# Patient Record
Sex: MALE | Race: WHITE | Employment: FULL TIME | URBAN - METROPOLITAN AREA
[De-identification: names, ages, dates, MRNs, and addresses within clinical notes are randomized per-mention and may not be internally consistent; named-entity substitution may affect disease eponyms.]

---

## 2017-08-03 PROCEDURE — 81015 MICROSCOPIC EXAM OF URINE: CPT | Performed by: PHYSICIAN ASSISTANT

## 2017-08-03 PROCEDURE — 81001 URINALYSIS AUTO W/SCOPE: CPT | Performed by: PHYSICIAN ASSISTANT

## 2017-08-04 PROCEDURE — 84154 ASSAY OF PSA FREE: CPT | Performed by: UROLOGY

## 2017-08-04 PROCEDURE — 84153 ASSAY OF PSA TOTAL: CPT | Performed by: UROLOGY

## 2017-11-27 PROCEDURE — 80061 LIPID PANEL: CPT | Performed by: INTERNAL MEDICINE

## 2018-03-09 PROCEDURE — 84153 ASSAY OF PSA TOTAL: CPT | Performed by: UROLOGY

## 2018-03-09 PROCEDURE — 84154 ASSAY OF PSA FREE: CPT | Performed by: UROLOGY

## 2018-03-09 PROCEDURE — 80053 COMPREHEN METABOLIC PANEL: CPT | Performed by: UROLOGY

## 2018-03-09 PROCEDURE — 80061 LIPID PANEL: CPT | Performed by: UROLOGY

## 2018-03-09 PROCEDURE — 36415 COLL VENOUS BLD VENIPUNCTURE: CPT | Performed by: UROLOGY

## 2018-03-12 PROCEDURE — 82043 UR ALBUMIN QUANTITATIVE: CPT | Performed by: INTERNAL MEDICINE

## 2018-03-12 PROCEDURE — 82570 ASSAY OF URINE CREATININE: CPT | Performed by: INTERNAL MEDICINE

## 2018-03-12 PROCEDURE — 36415 COLL VENOUS BLD VENIPUNCTURE: CPT | Performed by: INTERNAL MEDICINE

## 2019-04-17 PROCEDURE — 86803 HEPATITIS C AB TEST: CPT | Performed by: INTERNAL MEDICINE

## 2019-10-31 PROBLEM — E66.812 OBESITY, CLASS II, BMI 35-39.9: Status: ACTIVE | Noted: 2019-10-31

## 2019-10-31 PROBLEM — E66.9 OBESITY, CLASS II, BMI 35-39.9: Status: ACTIVE | Noted: 2019-10-31

## 2020-01-20 ENCOUNTER — APPOINTMENT (OUTPATIENT)
Dept: LAB | Facility: HOSPITAL | Age: 65
End: 2020-01-20
Payer: COMMERCIAL

## 2020-01-20 ENCOUNTER — HOSPITAL ENCOUNTER (OUTPATIENT)
Dept: PHYSICAL THERAPY | Facility: HOSPITAL | Age: 65
Discharge: HOME | End: 2020-01-20
Attending: ORTHOPAEDIC SURGERY
Payer: COMMERCIAL

## 2020-01-20 ENCOUNTER — LABORATORY ENCOUNTER (OUTPATIENT)
Dept: LAB | Facility: HOSPITAL | Age: 65
End: 2020-01-20
Payer: COMMERCIAL

## 2020-01-20 DIAGNOSIS — M16.11 PRIMARY OSTEOARTHRITIS OF RIGHT HIP: ICD-10-CM

## 2020-01-20 LAB
ANION GAP SERPL CALC-SCNC: 5 MMOL/L (ref 0–18)
ANTIBODY SCREEN: NEGATIVE
BASOPHILS # BLD AUTO: 0.07 X10(3) UL (ref 0–0.2)
BASOPHILS NFR BLD AUTO: 0.9 %
BUN BLD-MCNC: 16 MG/DL (ref 7–18)
BUN/CREAT SERPL: 17.8 (ref 10–20)
CALCIUM BLD-MCNC: 9.3 MG/DL (ref 8.5–10.1)
CHLORIDE SERPL-SCNC: 106 MMOL/L (ref 98–112)
CO2 SERPL-SCNC: 27 MMOL/L (ref 21–32)
CREAT BLD-MCNC: 0.9 MG/DL (ref 0.7–1.3)
DEPRECATED RDW RBC AUTO: 43.9 FL (ref 35.1–46.3)
EOSINOPHIL # BLD AUTO: 0.36 X10(3) UL (ref 0–0.7)
EOSINOPHIL NFR BLD AUTO: 4.9 %
ERYTHROCYTE [DISTWIDTH] IN BLOOD BY AUTOMATED COUNT: 12.7 % (ref 11–15)
GLUCOSE BLD-MCNC: 151 MG/DL (ref 70–99)
HCT VFR BLD AUTO: 43.2 % (ref 39–53)
HGB BLD-MCNC: 13.9 G/DL (ref 13–17.5)
IMM GRANULOCYTES # BLD AUTO: 0.02 X10(3) UL (ref 0–1)
IMM GRANULOCYTES NFR BLD: 0.3 %
LYMPHOCYTES # BLD AUTO: 2.02 X10(3) UL (ref 1–4)
LYMPHOCYTES NFR BLD AUTO: 27.3 %
MCH RBC QN AUTO: 30.5 PG (ref 26–34)
MCHC RBC AUTO-ENTMCNC: 32.2 G/DL (ref 31–37)
MCV RBC AUTO: 94.7 FL (ref 80–100)
MONOCYTES # BLD AUTO: 0.74 X10(3) UL (ref 0.1–1)
MONOCYTES NFR BLD AUTO: 10 %
NEUTROPHILS # BLD AUTO: 4.18 X10 (3) UL (ref 1.5–7.7)
NEUTROPHILS # BLD AUTO: 4.18 X10(3) UL (ref 1.5–7.7)
NEUTROPHILS NFR BLD AUTO: 56.6 %
OSMOLALITY SERPL CALC.SUM OF ELEC: 290 MOSM/KG (ref 275–295)
PLATELET # BLD AUTO: 279 10(3)UL (ref 150–450)
POTASSIUM SERPL-SCNC: 4.3 MMOL/L (ref 3.5–5.1)
RBC # BLD AUTO: 4.56 X10(6)UL (ref 4.3–5.7)
RH BLOOD TYPE: POSITIVE
SODIUM SERPL-SCNC: 138 MMOL/L (ref 136–145)
WBC # BLD AUTO: 7.4 X10(3) UL (ref 4–11)

## 2020-01-20 PROCEDURE — 86900 BLOOD TYPING SEROLOGIC ABO: CPT

## 2020-01-20 PROCEDURE — 80048 BASIC METABOLIC PNL TOTAL CA: CPT

## 2020-01-20 PROCEDURE — 85025 COMPLETE CBC W/AUTO DIFF WBC: CPT

## 2020-01-20 PROCEDURE — 36415 COLL VENOUS BLD VENIPUNCTURE: CPT

## 2020-01-20 PROCEDURE — 86850 RBC ANTIBODY SCREEN: CPT

## 2020-01-20 PROCEDURE — 86901 BLOOD TYPING SEROLOGIC RH(D): CPT

## 2020-01-29 NOTE — H&P (VIEW-ONLY)
Patient ID: Oriana Oh     Chief Complaint:    Patient presents with:  Pre-Op: pre op for 2/6/2020 right total hip       HPI:    Oriana Oh is a 59year old male who presents today for Patient presents with:  Pre-Op: pre op for 2/6/2020 right total without mention of complication, not stated as uncontrolled    • Unspecified essential hypertension    • Unspecified sleep apnea     not using CPAP   • Visual impairment     glasses     Past Surgical History:   Procedure Laterality Date   • COLONOSCOPY  20 Besylate 10 MG Oral Tab Take 1 tablet (10 mg total) by mouth once daily. 90 tablet 3   • ACCU-CHEK FASTCLIX LANCETS Does not apply Misc Pt has accu-chek joan meter. Tests twice daily 200 each 3   • Multiple Vitamins-Minerals (MULTI-VITAMIN/MINERALS) Oral Ta foot  Negative Trendelenburg sign  Negative Stinchfield sign  2+ pedal pulses and brisk capillary refill  No skin rashes or lesion noted  Knee: no deformity noted, PF crepitus mild,no joint line tenderness, no effusion, ROM 0-120    Left hip: Limited inter length discrepancy, failure of the implant, need for future surgeries, continued pain, hematoma, need for transfusion, and death, among others. The patient understands and wishes to proceed.      The spectrum of treatment options were discussed with the pa expected, requiring blood transfusion. You may get an infection which will require additional surgery and possibly removal of all implants to cure.  Patients with diabetes or who are on certain medications to suppress the immune system are particularly at lumbar surgery  Allergy ace inhibitor    - labs and clearance reviewed  - questions answered  - proceed with surgery as planned R HUMBERTO    Diagnoses and all orders for this visit:    Primary osteoarthritis of right hip  -     OFFICE/OUTPT VISIT,REX WHALEN II

## 2020-02-06 ENCOUNTER — ANESTHESIA (OUTPATIENT)
Dept: SURGERY | Facility: HOSPITAL | Age: 65
End: 2020-02-06
Payer: COMMERCIAL

## 2020-02-06 ENCOUNTER — HOSPITAL ENCOUNTER (OUTPATIENT)
Facility: HOSPITAL | Age: 65
Setting detail: HOSPITAL OUTPATIENT SURGERY
Discharge: HOME | End: 2020-02-06
Attending: ORTHOPAEDIC SURGERY | Admitting: ORTHOPAEDIC SURGERY
Payer: COMMERCIAL

## 2020-02-06 ENCOUNTER — ANESTHESIA EVENT (OUTPATIENT)
Dept: SURGERY | Facility: HOSPITAL | Age: 65
End: 2020-02-06
Payer: COMMERCIAL

## 2020-02-06 ENCOUNTER — APPOINTMENT (OUTPATIENT)
Dept: GENERAL RADIOLOGY | Facility: HOSPITAL | Age: 65
End: 2020-02-06
Attending: ORTHOPAEDIC SURGERY
Payer: COMMERCIAL

## 2020-02-06 VITALS
BODY MASS INDEX: 35.03 KG/M2 | RESPIRATION RATE: 18 BRPM | WEIGHT: 244.69 LBS | DIASTOLIC BLOOD PRESSURE: 80 MMHG | SYSTOLIC BLOOD PRESSURE: 143 MMHG | OXYGEN SATURATION: 93 % | HEIGHT: 70 IN | HEART RATE: 69 BPM | TEMPERATURE: 98 F

## 2020-02-06 DIAGNOSIS — M16.11 PRIMARY OSTEOARTHRITIS OF RIGHT HIP: Primary | ICD-10-CM

## 2020-02-06 LAB
GLUCOSE BLD-MCNC: 133 MG/DL (ref 70–99)
GLUCOSE BLD-MCNC: 177 MG/DL (ref 70–99)

## 2020-02-06 PROCEDURE — 82962 GLUCOSE BLOOD TEST: CPT

## 2020-02-06 PROCEDURE — 73501 X-RAY EXAM HIP UNI 1 VIEW: CPT | Performed by: ORTHOPAEDIC SURGERY

## 2020-02-06 PROCEDURE — 76000 FLUOROSCOPY <1 HR PHYS/QHP: CPT | Performed by: ORTHOPAEDIC SURGERY

## 2020-02-06 PROCEDURE — 88305 TISSUE EXAM BY PATHOLOGIST: CPT | Performed by: ORTHOPAEDIC SURGERY

## 2020-02-06 PROCEDURE — 97116 GAIT TRAINING THERAPY: CPT

## 2020-02-06 PROCEDURE — 97161 PT EVAL LOW COMPLEX 20 MIN: CPT

## 2020-02-06 PROCEDURE — 88311 DECALCIFY TISSUE: CPT | Performed by: ORTHOPAEDIC SURGERY

## 2020-02-06 PROCEDURE — 0SR90JA REPLACEMENT OF RIGHT HIP JOINT WITH SYNTHETIC SUBSTITUTE, UNCEMENTED, OPEN APPROACH: ICD-10-PCS | Performed by: ORTHOPAEDIC SURGERY

## 2020-02-06 PROCEDURE — 97535 SELF CARE MNGMENT TRAINING: CPT

## 2020-02-06 PROCEDURE — 97165 OT EVAL LOW COMPLEX 30 MIN: CPT

## 2020-02-06 PROCEDURE — 97530 THERAPEUTIC ACTIVITIES: CPT

## 2020-02-06 DEVICE — BIOLOX® DELTA, CERAMIC FEMORAL HEAD, M, Ø 36/0, TAPER 12/14
Type: IMPLANTABLE DEVICE | Site: HIP | Status: FUNCTIONAL
Brand: BIOLOX® DELTA

## 2020-02-06 RX ORDER — SODIUM CHLORIDE, SODIUM LACTATE, POTASSIUM CHLORIDE, CALCIUM CHLORIDE 600; 310; 30; 20 MG/100ML; MG/100ML; MG/100ML; MG/100ML
INJECTION, SOLUTION INTRAVENOUS CONTINUOUS
Status: DISCONTINUED | OUTPATIENT
Start: 2020-02-06 | End: 2020-02-06

## 2020-02-06 RX ORDER — METOCLOPRAMIDE HYDROCHLORIDE 5 MG/ML
10 INJECTION INTRAMUSCULAR; INTRAVENOUS EVERY 6 HOURS PRN
Status: DISCONTINUED | OUTPATIENT
Start: 2020-02-06 | End: 2020-02-06

## 2020-02-06 RX ORDER — ACETAMINOPHEN 325 MG/1
650 TABLET ORAL EVERY 6 HOURS PRN
Status: DISCONTINUED | OUTPATIENT
Start: 2020-02-06 | End: 2020-02-06

## 2020-02-06 RX ORDER — ASPIRIN 325 MG
325 TABLET, DELAYED RELEASE (ENTERIC COATED) ORAL DAILY
Status: DISCONTINUED | OUTPATIENT
Start: 2020-02-06 | End: 2020-02-06

## 2020-02-06 RX ORDER — OXYCODONE HYDROCHLORIDE 5 MG/1
15 TABLET ORAL EVERY 4 HOURS PRN
Status: CANCELLED | OUTPATIENT
Start: 2020-02-06 | End: 2020-02-08

## 2020-02-06 RX ORDER — ONDANSETRON 2 MG/ML
4 INJECTION INTRAMUSCULAR; INTRAVENOUS EVERY 4 HOURS PRN
Status: DISCONTINUED | OUTPATIENT
Start: 2020-02-06 | End: 2020-02-06

## 2020-02-06 RX ORDER — HYDROCODONE BITARTRATE AND ACETAMINOPHEN 10; 325 MG/1; MG/1
2 TABLET ORAL AS NEEDED
Status: COMPLETED | OUTPATIENT
Start: 2020-02-06 | End: 2020-02-06

## 2020-02-06 RX ORDER — NALOXONE HYDROCHLORIDE 0.4 MG/ML
80 INJECTION, SOLUTION INTRAMUSCULAR; INTRAVENOUS; SUBCUTANEOUS AS NEEDED
Status: DISCONTINUED | OUTPATIENT
Start: 2020-02-06 | End: 2020-02-06

## 2020-02-06 RX ORDER — DEXTROSE MONOHYDRATE 25 G/50ML
50 INJECTION, SOLUTION INTRAVENOUS
Status: DISCONTINUED | OUTPATIENT
Start: 2020-02-06 | End: 2020-02-06

## 2020-02-06 RX ORDER — ONDANSETRON 2 MG/ML
4 INJECTION INTRAMUSCULAR; INTRAVENOUS AS NEEDED
Status: DISCONTINUED | OUTPATIENT
Start: 2020-02-06 | End: 2020-02-06

## 2020-02-06 RX ORDER — DIPHENHYDRAMINE HYDROCHLORIDE 50 MG/ML
25 INJECTION INTRAMUSCULAR; INTRAVENOUS ONCE AS NEEDED
Status: DISCONTINUED | OUTPATIENT
Start: 2020-02-06 | End: 2020-02-06

## 2020-02-06 RX ORDER — CEFAZOLIN SODIUM/WATER 2 G/20 ML
2 SYRINGE (ML) INTRAVENOUS EVERY 8 HOURS
Status: CANCELLED | OUTPATIENT
Start: 2020-02-06 | End: 2020-02-06

## 2020-02-06 RX ORDER — OXYCODONE HYDROCHLORIDE 5 MG/1
TABLET ORAL
Qty: 30 TABLET | Refills: 0 | Status: SHIPPED | OUTPATIENT
Start: 2020-02-06 | End: 2020-02-12

## 2020-02-06 RX ORDER — SCOLOPAMINE TRANSDERMAL SYSTEM 1 MG/1
1 PATCH, EXTENDED RELEASE TRANSDERMAL ONCE
Status: DISCONTINUED | OUTPATIENT
Start: 2020-02-06 | End: 2020-02-06 | Stop reason: HOSPADM

## 2020-02-06 RX ORDER — HYDROMORPHONE HYDROCHLORIDE 1 MG/ML
0.8 INJECTION, SOLUTION INTRAMUSCULAR; INTRAVENOUS; SUBCUTANEOUS EVERY 2 HOUR PRN
Status: CANCELLED | OUTPATIENT
Start: 2020-02-06 | End: 2020-02-08

## 2020-02-06 RX ORDER — CEFAZOLIN SODIUM/WATER 2 G/20 ML
2 SYRINGE (ML) INTRAVENOUS ONCE
Status: COMPLETED | OUTPATIENT
Start: 2020-02-06 | End: 2020-02-06

## 2020-02-06 RX ORDER — ACETAMINOPHEN 325 MG/1
TABLET ORAL
Status: COMPLETED
Start: 2020-02-06 | End: 2020-02-06

## 2020-02-06 RX ORDER — HYDROMORPHONE HYDROCHLORIDE 1 MG/ML
INJECTION, SOLUTION INTRAMUSCULAR; INTRAVENOUS; SUBCUTANEOUS
Status: COMPLETED
Start: 2020-02-06 | End: 2020-02-06

## 2020-02-06 RX ORDER — CELECOXIB 200 MG/1
200 CAPSULE ORAL DAILY
Qty: 30 CAPSULE | Refills: 0 | Status: SHIPPED | OUTPATIENT
Start: 2020-02-06 | End: 2020-06-17

## 2020-02-06 RX ORDER — ACETAMINOPHEN 325 MG/1
650 TABLET ORAL EVERY 4 HOURS
Qty: 30 TABLET | Refills: 0 | Status: SHIPPED | OUTPATIENT
Start: 2020-02-06 | End: 2020-06-17

## 2020-02-06 RX ORDER — OXYCODONE HYDROCHLORIDE 5 MG/1
10 TABLET ORAL EVERY 4 HOURS PRN
Status: CANCELLED | OUTPATIENT
Start: 2020-02-06 | End: 2020-02-08

## 2020-02-06 RX ORDER — OXYCODONE HYDROCHLORIDE 5 MG/1
5 TABLET ORAL EVERY 4 HOURS PRN
Status: CANCELLED | OUTPATIENT
Start: 2020-02-06 | End: 2020-02-08

## 2020-02-06 RX ORDER — MIDAZOLAM HYDROCHLORIDE 1 MG/ML
INJECTION INTRAMUSCULAR; INTRAVENOUS AS NEEDED
Status: DISCONTINUED | OUTPATIENT
Start: 2020-02-06 | End: 2020-02-06 | Stop reason: SURG

## 2020-02-06 RX ORDER — TRANEXAMIC ACID 10 MG/ML
INJECTION, SOLUTION INTRAVENOUS AS NEEDED
Status: DISCONTINUED | OUTPATIENT
Start: 2020-02-06 | End: 2020-02-06 | Stop reason: SURG

## 2020-02-06 RX ORDER — TRAMADOL HYDROCHLORIDE 50 MG/1
TABLET ORAL
Qty: 40 TABLET | Refills: 0 | Status: SHIPPED | OUTPATIENT
Start: 2020-02-06 | End: 2020-02-12

## 2020-02-06 RX ORDER — DEXTROSE MONOHYDRATE 25 G/50ML
50 INJECTION, SOLUTION INTRAVENOUS
Status: DISCONTINUED | OUTPATIENT
Start: 2020-02-06 | End: 2020-02-06 | Stop reason: HOSPADM

## 2020-02-06 RX ORDER — HYDROMORPHONE HYDROCHLORIDE 1 MG/ML
0.4 INJECTION, SOLUTION INTRAMUSCULAR; INTRAVENOUS; SUBCUTANEOUS EVERY 2 HOUR PRN
Status: CANCELLED | OUTPATIENT
Start: 2020-02-06 | End: 2020-02-08

## 2020-02-06 RX ORDER — TRANEXAMIC ACID 10 MG/ML
1000 INJECTION, SOLUTION INTRAVENOUS
Status: DISPENSED | OUTPATIENT
Start: 2020-02-06 | End: 2020-02-06

## 2020-02-06 RX ORDER — ACETAMINOPHEN 325 MG/1
650 TABLET ORAL 4 TIMES DAILY
Status: CANCELLED | OUTPATIENT
Start: 2020-02-06 | End: 2020-02-08

## 2020-02-06 RX ORDER — HYDROCODONE BITARTRATE AND ACETAMINOPHEN 10; 325 MG/1; MG/1
1 TABLET ORAL AS NEEDED
Status: COMPLETED | OUTPATIENT
Start: 2020-02-06 | End: 2020-02-06

## 2020-02-06 RX ORDER — TIZANIDINE 2 MG/1
2 TABLET ORAL 3 TIMES DAILY PRN
Status: CANCELLED | OUTPATIENT
Start: 2020-02-06

## 2020-02-06 RX ORDER — PROCHLORPERAZINE EDISYLATE 5 MG/ML
10 INJECTION INTRAMUSCULAR; INTRAVENOUS EVERY 6 HOURS PRN
Status: CANCELLED | OUTPATIENT
Start: 2020-02-06 | End: 2020-02-08

## 2020-02-06 RX ORDER — METOCLOPRAMIDE HYDROCHLORIDE 5 MG/ML
10 INJECTION INTRAMUSCULAR; INTRAVENOUS AS NEEDED
Status: DISCONTINUED | OUTPATIENT
Start: 2020-02-06 | End: 2020-02-06

## 2020-02-06 RX ORDER — ASPIRIN 325 MG
325 TABLET ORAL 2 TIMES DAILY
Status: DISCONTINUED | OUTPATIENT
Start: 2020-02-06 | End: 2020-02-06

## 2020-02-06 RX ORDER — ACETAMINOPHEN 500 MG
1000 TABLET ORAL ONCE
Status: DISCONTINUED | OUTPATIENT
Start: 2020-02-06 | End: 2020-02-06

## 2020-02-06 RX ORDER — LOVASTATIN 20 MG/1
20 TABLET ORAL NIGHTLY
COMMUNITY
End: 2020-03-27

## 2020-02-06 RX ORDER — HYDROMORPHONE HYDROCHLORIDE 1 MG/ML
0.2 INJECTION, SOLUTION INTRAMUSCULAR; INTRAVENOUS; SUBCUTANEOUS EVERY 2 HOUR PRN
Status: CANCELLED | OUTPATIENT
Start: 2020-02-06 | End: 2020-02-08

## 2020-02-06 RX ORDER — HYDROMORPHONE HYDROCHLORIDE 1 MG/ML
0.4 INJECTION, SOLUTION INTRAMUSCULAR; INTRAVENOUS; SUBCUTANEOUS EVERY 5 MIN PRN
Status: DISCONTINUED | OUTPATIENT
Start: 2020-02-06 | End: 2020-02-06

## 2020-02-06 RX ORDER — ASPIRIN 325 MG
325 TABLET, DELAYED RELEASE (ENTERIC COATED) ORAL 2 TIMES DAILY
Qty: 84 TABLET | Refills: 0 | Status: SHIPPED | OUTPATIENT
Start: 2020-02-06 | End: 2020-06-17

## 2020-02-06 RX ADMIN — CEFAZOLIN SODIUM/WATER 2 G: 2 G/20 ML SYRINGE (ML) INTRAVENOUS at 07:19:00

## 2020-02-06 RX ADMIN — MIDAZOLAM HYDROCHLORIDE 4 MG: 1 INJECTION INTRAMUSCULAR; INTRAVENOUS at 07:04:00

## 2020-02-06 RX ADMIN — TRANEXAMIC ACID 1000 MG: 10 INJECTION, SOLUTION INTRAVENOUS at 07:30:00

## 2020-02-06 RX ADMIN — SODIUM CHLORIDE, SODIUM LACTATE, POTASSIUM CHLORIDE, CALCIUM CHLORIDE: 600; 310; 30; 20 INJECTION, SOLUTION INTRAVENOUS at 07:59:00

## 2020-02-06 RX ADMIN — SODIUM CHLORIDE, SODIUM LACTATE, POTASSIUM CHLORIDE, CALCIUM CHLORIDE: 600; 310; 30; 20 INJECTION, SOLUTION INTRAVENOUS at 09:10:00

## 2020-02-06 NOTE — OCCUPATIONAL THERAPY NOTE
OCCUPATIONAL THERAPY QUICK EVALUATION - INPATIENT    Room Number: [facility] PRE ASCC/ PRE ASCC Pool  Evaluation Date: 2/6/2020     Type of Evaluation: Quick Eval  Presenting Problem: s/p R HUMBERTO on 2/6     Physician Order: IP Consult to Occupational Therapy  Reas )    Occupation/Status: FT - has to walk long distances occasionally   Hand Dominance: Right  Drives: Yes  Patient Regularly Uses: Glasses    Prior Level of Function: Pt lives with his wife.  Pt is typically independent with all ADL and functional in bed in a semi-supine position. Pt performed supine>sit EOB with CG assistance. Pt performed sit>stand with RW and CG assistance with min verbal/visual/tactile cues for safety with RW use and hand placement.  Pt performed functional mobility with CG jo ann Patient discharged from Occupational Therapy services. Please re-order if a new functional limitation presents during this admission. Patient was able to achieve the following goals:  Patient able to toilet transfer: at supervision level.    Patient abl

## 2020-02-06 NOTE — INTERVAL H&P NOTE
Pre-op Diagnosis: Primary osteoarthritis of right hip [M16.11]    The above referenced H&P was reviewed by Forrest Berkowitz MD on 2/6/2020, the patient was examined and no significant changes have occurred in the patient's condition since the H&P was perform

## 2020-02-06 NOTE — CM/SW NOTE
02/06/20 1500   CM/SW Referral Data   Referral Source Nurse   Reason for Referral Discharge planning   Informant Patient;Spouse   Patient Info   Patient's Mental Status Alert;Oriented   Discharge Needs   Anticipated D/C needs Home health care       HOME

## 2020-02-06 NOTE — OPERATIVE REPORT
OPERATIVE REPORT    Facility:  [facility]    Patient Name:  Catia Raphael    Age/Gender:  72year old male  :  1955    MRN:  [id number]    Date of Operation:  2020    Preoperative Diagnosis:  RIGHT HIP OSTEOARTHRITIS    Postoperative Viri Osteotomy of the femoral neck was performed using a sagittal saw. A corkscrew was used to remove the femoral head. Retractors were placed anterior and posterior to the acetabulum.   The hip labrum as well as the soft tissue in the cotyloid fossa were kurt anesthetic and pain medicine was injected into the hip capsule and surrounding soft tissues. The fascia meet was closed with absorbable suture. Skin was then closed in 2 layers with absorbable suture.   Sterile dressings were applied in the forma of a pre

## 2020-02-06 NOTE — ANESTHESIA PROCEDURE NOTES
Regional Block  Performed by: Keysha Prabhakar MD  Authorized by: Keysha Prabhakar MD       General Information and Staff    Start Time:  2/6/2020 7:21 AM  End Time:  2/6/2020 7:22 AM  Anesthesiologist:  Keysha Prabhakar MD  Performed by:   Anesthesio

## 2020-02-06 NOTE — ANESTHESIA PROCEDURE NOTES
Spinal Block  Performed by: Hussain Bhatt MD  Authorized by: Hussain Bhatt MD       General Information and Staff    Start Time:  2/6/2020 7:04 AM  End Time:  2/6/2020 7:10 AM  Anesthesiologist:  Hussain Bhatt MD  Performed by:   Anesthesiolo

## 2020-02-06 NOTE — PHYSICAL THERAPY NOTE
PHYSICAL THERAPY HIP EVALUATION - INPATIENT     Room Number: [facility] PRE ASCC/ PRE ASCC Pool  Evaluation Date: 2/6/2020  Type of Evaluation: Initial  Physician Order: PT Eval and Treat    Presenting Problem: s/p left HUMBERTO 2/6/2020  Reason for Therapy: Mobility assist as needed upon edw dc.         SUBJECTIVE  \"I am hoping to go home today\"    Patient self-stated goal is to go home today    OBJECTIVE  Precautions: None(no hip precautions per conversation with Dr Davi Damon)  Fall Risk: Standard fall risk    WEIGHT Gait Assistance: Supervision  Distance (ft): 150  Assistive Device: Rolling walker  Pattern: R Decreased stance time  Stoop/Curb Assistance: Supervision  Comment : 4 stairs with railing and cane with supervision    Skilled Therapy Provided: Pt seen by [facility] home PT.      DISCHARGE RECOMMENDATIONS  PT Discharge Recommendations: Home with home health PT                    CURRENT GOALS  Goal #1  Patient is able to demonstrate supine - sit EOB @ level: supervision     Goal #2  Patient is able to demonstrate bethea

## 2020-02-06 NOTE — ANESTHESIA PREPROCEDURE EVALUATION
PRE-OP EVALUATION    Patient Name: Collins Phalen    Pre-op Diagnosis: Primary osteoarthritis of right hip [M16.11]    Procedure(s):  RIGHT ANTERIOR HIP ARTHROPLASTY     Surgeon(s) and Role:     Tee Ray MD - Primary    Pre-op vitals reviewed.   Tem mouth once daily. , Disp: 90 tablet, Rfl: 3  tamsulosin HCl (FLOMAX) 0.4 MG Oral Cap, Take 1 capsule (0.4 mg total) by mouth every evening., Disp: 90 capsule, Rfl: 3  metFORMIN HCl 500 MG Oral Tab, TAKE 2 TABLETS BY MOUTH IN THE MORNING AND 2 TABLETS IN THE Smokeless tobacco: Former User        Types: Snuff, Chew      Tobacco comment: chews tobacco    Alcohol use: Yes      Frequency: 2-3 times a week      Drinks per session: 1 or 2      Binge frequency: Never      Drug use: No     Available pre-op labs revie

## 2020-02-06 NOTE — ANESTHESIA POSTPROCEDURE EVALUATION
[address] Patient Status:  Surgery Admit - Inpt   Age/Gender 72year old male MRN [id number]   [facility] SURGERY Attending Jayden Rogers MD   [facility] Day # 0 PCP Khari Barriga MD       Anesthesia Post-op Note    Procedu

## 2020-02-27 ENCOUNTER — APPOINTMENT (OUTPATIENT)
Dept: REHABILITATION | Age: 65
End: 2020-02-27

## 2020-03-02 DIAGNOSIS — Z96.641 HISTORY OF RIGHT HIP REPLACEMENT: Primary | ICD-10-CM

## 2020-03-03 ENCOUNTER — APPOINTMENT (OUTPATIENT)
Dept: REHABILITATION | Age: 65
End: 2020-03-03

## 2020-03-03 ENCOUNTER — HOSPITAL ENCOUNTER (OUTPATIENT)
Dept: REHABILITATION | Age: 65
Discharge: STILL ADMITTED OR ADMINISTRATIVE | End: 2020-03-03
Attending: ORTHOPAEDIC SURGERY

## 2020-03-03 DIAGNOSIS — Z96.641 HISTORY OF RIGHT HIP REPLACEMENT: ICD-10-CM

## 2020-03-03 PROCEDURE — 97161 PT EVAL LOW COMPLEX 20 MIN: CPT | Performed by: PHYSICAL THERAPIST

## 2020-03-03 PROCEDURE — 97110 THERAPEUTIC EXERCISES: CPT | Performed by: PHYSICAL THERAPIST

## 2020-03-03 ASSESSMENT — ENCOUNTER SYMPTOMS
QUALITY: STIFF
SUBJECTIVE PAIN PROGRESSION: IMPROVED
QUALITY: TIGHT
PAIN SEVERITY NOW: 0
ALLEVIATING FACTORS: ICE

## 2020-03-03 ASSESSMENT — MOVEMENT AND STRENGTH ASSESSMENTS: LEFS TYPE SCORE: 49

## 2020-03-05 ENCOUNTER — APPOINTMENT (OUTPATIENT)
Dept: REHABILITATION | Age: 65
End: 2020-03-05

## 2020-03-05 ENCOUNTER — HOSPITAL ENCOUNTER (OUTPATIENT)
Dept: REHABILITATION | Age: 65
Discharge: STILL ADMITTED OR ADMINISTRATIVE | End: 2020-03-05
Attending: ORTHOPAEDIC SURGERY

## 2020-03-05 PROCEDURE — 97110 THERAPEUTIC EXERCISES: CPT

## 2020-03-05 PROCEDURE — 97140 MANUAL THERAPY 1/> REGIONS: CPT

## 2020-03-05 PROCEDURE — 97116 GAIT TRAINING THERAPY: CPT

## 2020-03-10 ENCOUNTER — APPOINTMENT (OUTPATIENT)
Dept: REHABILITATION | Age: 65
End: 2020-03-10

## 2020-03-17 ENCOUNTER — HOSPITAL ENCOUNTER (OUTPATIENT)
Dept: REHABILITATION | Age: 65
Discharge: STILL ADMITTED OR ADMINISTRATIVE | End: 2020-03-17

## 2020-03-17 DIAGNOSIS — Z96.641 HISTORY OF RIGHT HIP REPLACEMENT: ICD-10-CM

## 2020-03-17 PROCEDURE — 97110 THERAPEUTIC EXERCISES: CPT | Performed by: PHYSICAL THERAPIST

## 2020-03-17 PROCEDURE — 97140 MANUAL THERAPY 1/> REGIONS: CPT | Performed by: PHYSICAL THERAPIST

## 2020-03-17 ASSESSMENT — ENCOUNTER SYMPTOMS: PAIN SEVERITY NOW: 0

## 2020-03-19 ENCOUNTER — HOSPITAL ENCOUNTER (OUTPATIENT)
Dept: REHABILITATION | Age: 65
Discharge: STILL ADMITTED OR ADMINISTRATIVE | End: 2020-03-19

## 2020-03-19 DIAGNOSIS — Z96.641 HISTORY OF RIGHT HIP REPLACEMENT: ICD-10-CM

## 2020-03-19 PROCEDURE — 97140 MANUAL THERAPY 1/> REGIONS: CPT | Performed by: PHYSICAL THERAPIST

## 2020-03-19 PROCEDURE — 97110 THERAPEUTIC EXERCISES: CPT | Performed by: PHYSICAL THERAPIST

## 2020-03-19 ASSESSMENT — ENCOUNTER SYMPTOMS: PAIN SEVERITY NOW: 0

## 2020-03-24 ENCOUNTER — HOSPITAL ENCOUNTER (OUTPATIENT)
Dept: REHABILITATION | Age: 65
Discharge: STILL ADMITTED OR ADMINISTRATIVE | End: 2020-03-24

## 2020-03-24 DIAGNOSIS — Z96.641 HISTORY OF RIGHT HIP REPLACEMENT: ICD-10-CM

## 2020-03-24 PROCEDURE — 97110 THERAPEUTIC EXERCISES: CPT | Performed by: PHYSICAL THERAPIST

## 2020-03-24 PROCEDURE — 97140 MANUAL THERAPY 1/> REGIONS: CPT | Performed by: PHYSICAL THERAPIST

## 2020-03-24 ASSESSMENT — ENCOUNTER SYMPTOMS: PAIN SEVERITY NOW: 0

## 2020-03-26 ENCOUNTER — HOSPITAL ENCOUNTER (OUTPATIENT)
Dept: REHABILITATION | Age: 65
Discharge: STILL ADMITTED OR ADMINISTRATIVE | End: 2020-03-26

## 2020-03-26 DIAGNOSIS — Z96.641 HISTORY OF RIGHT HIP REPLACEMENT: ICD-10-CM

## 2020-03-26 PROCEDURE — 97110 THERAPEUTIC EXERCISES: CPT | Performed by: PHYSICAL THERAPIST

## 2020-03-26 PROCEDURE — 97140 MANUAL THERAPY 1/> REGIONS: CPT | Performed by: PHYSICAL THERAPIST

## 2020-03-26 ASSESSMENT — ENCOUNTER SYMPTOMS: PAIN SEVERITY NOW: 0

## 2020-03-31 ENCOUNTER — HOSPITAL ENCOUNTER (OUTPATIENT)
Dept: REHABILITATION | Age: 65
Discharge: STILL ADMITTED OR ADMINISTRATIVE | End: 2020-03-31

## 2020-03-31 PROCEDURE — 97140 MANUAL THERAPY 1/> REGIONS: CPT | Performed by: PHYSICAL THERAPIST

## 2020-03-31 PROCEDURE — 97110 THERAPEUTIC EXERCISES: CPT | Performed by: PHYSICAL THERAPIST

## 2020-03-31 ASSESSMENT — ENCOUNTER SYMPTOMS: PAIN SEVERITY NOW: 0

## 2020-04-02 ENCOUNTER — HOSPITAL ENCOUNTER (OUTPATIENT)
Dept: REHABILITATION | Age: 65
Discharge: STILL ADMITTED OR ADMINISTRATIVE | End: 2020-04-02
Attending: ORTHOPAEDIC SURGERY

## 2020-04-02 PROCEDURE — 97110 THERAPEUTIC EXERCISES: CPT | Performed by: PHYSICAL THERAPIST

## 2020-04-02 PROCEDURE — 97140 MANUAL THERAPY 1/> REGIONS: CPT | Performed by: PHYSICAL THERAPIST

## 2020-04-02 ASSESSMENT — ENCOUNTER SYMPTOMS: PAIN SEVERITY NOW: 0

## 2020-04-07 ENCOUNTER — APPOINTMENT (OUTPATIENT)
Dept: REHABILITATION | Age: 65
End: 2020-04-07

## 2020-04-09 ENCOUNTER — HOSPITAL ENCOUNTER (OUTPATIENT)
Dept: REHABILITATION | Age: 65
Discharge: STILL ADMITTED OR ADMINISTRATIVE | End: 2020-04-09

## 2020-04-09 PROCEDURE — 97110 THERAPEUTIC EXERCISES: CPT | Performed by: PHYSICAL THERAPIST

## 2020-04-09 PROCEDURE — 97140 MANUAL THERAPY 1/> REGIONS: CPT | Performed by: PHYSICAL THERAPIST

## 2020-06-11 PROBLEM — E11.9 CONTROLLED TYPE 2 DIABETES MELLITUS WITHOUT COMPLICATION, WITHOUT LONG-TERM CURRENT USE OF INSULIN (HCC): Status: ACTIVE | Noted: 2020-06-11

## 2021-02-20 ENCOUNTER — IMMUNIZATION (OUTPATIENT)
Dept: LAB | Age: 66
End: 2021-02-20

## 2021-02-20 DIAGNOSIS — Z23 NEED FOR VACCINATION: Primary | ICD-10-CM

## 2021-02-20 PROCEDURE — 0011A COVID-19 MODERNA VACCINE: CPT

## 2021-02-20 PROCEDURE — 91301 COVID-19 MODERNA VACCINE: CPT

## 2021-03-20 ENCOUNTER — IMMUNIZATION (OUTPATIENT)
Dept: LAB | Age: 66
End: 2021-03-20
Attending: HOSPITALIST

## 2021-03-20 DIAGNOSIS — Z23 NEED FOR VACCINATION: Primary | ICD-10-CM

## 2021-03-20 PROCEDURE — 0012A COVID-19 MODERNA VACCINE: CPT

## 2021-03-20 PROCEDURE — 91301 COVID-19 MODERNA VACCINE: CPT

## 2021-03-30 PROBLEM — I71.2 ASCENDING AORTIC ANEURYSM (HCC): Status: ACTIVE | Noted: 2021-03-30

## 2021-03-30 PROBLEM — I71.21 ASCENDING AORTIC ANEURYSM (HCC): Status: ACTIVE | Noted: 2021-03-30

## 2021-04-22 PROBLEM — I25.10 CORONARY ARTERY DISEASE INVOLVING NATIVE CORONARY ARTERY OF NATIVE HEART: Status: ACTIVE | Noted: 2021-04-22

## 2021-10-15 PROBLEM — E11.9 CONTROLLED TYPE 2 DIABETES MELLITUS WITHOUT COMPLICATION, WITHOUT LONG-TERM CURRENT USE OF INSULIN (HCC): Status: RESOLVED | Noted: 2020-06-11 | Resolved: 2021-10-15

## 2021-10-15 PROBLEM — E11.29 TYPE 2 DIABETES MELLITUS WITH MICROALBUMINURIA, WITHOUT LONG-TERM CURRENT USE OF INSULIN (HCC): Status: ACTIVE | Noted: 2021-10-15

## 2021-10-15 PROBLEM — I25.118 CORONARY ARTERY DISEASE OF NATIVE ARTERY OF NATIVE HEART WITH STABLE ANGINA PECTORIS (HCC): Status: ACTIVE | Noted: 2021-10-15

## 2021-10-15 PROBLEM — I25.10 CORONARY ARTERY DISEASE INVOLVING NATIVE CORONARY ARTERY OF NATIVE HEART: Status: RESOLVED | Noted: 2021-04-22 | Resolved: 2021-10-15

## 2021-10-15 PROBLEM — R80.9 TYPE 2 DIABETES MELLITUS WITH MICROALBUMINURIA, WITHOUT LONG-TERM CURRENT USE OF INSULIN (HCC): Status: ACTIVE | Noted: 2021-10-15

## 2022-03-01 PROBLEM — E66.01 SEVERE OBESITY (BMI 35.0-39.9) WITH COMORBIDITY (HCC): Status: ACTIVE | Noted: 2022-03-01

## 2022-12-14 ENCOUNTER — APPOINTMENT (OUTPATIENT)
Dept: GENERAL RADIOLOGY | Age: 67
End: 2022-12-14
Attending: PHYSICIAN ASSISTANT

## 2022-12-14 ENCOUNTER — HOSPITAL ENCOUNTER (EMERGENCY)
Age: 67
Discharge: HOME | End: 2022-12-14
Attending: STUDENT IN AN ORGANIZED HEALTH CARE EDUCATION/TRAINING PROGRAM

## 2022-12-14 VITALS
RESPIRATION RATE: 20 BRPM | SYSTOLIC BLOOD PRESSURE: 164 MMHG | HEART RATE: 78 BPM | DIASTOLIC BLOOD PRESSURE: 87 MMHG | OXYGEN SATURATION: 94 % | TEMPERATURE: 98.3 F

## 2022-12-14 DIAGNOSIS — J02.9 PHARYNGITIS WITH VIRAL SYNDROME: Primary | ICD-10-CM

## 2022-12-14 DIAGNOSIS — B34.9 PHARYNGITIS WITH VIRAL SYNDROME: Primary | ICD-10-CM

## 2022-12-14 LAB
ATRIAL RATE (BPM): 89
FLUAV RNA RESP QL NAA+PROBE: NOT DETECTED
FLUBV RNA RESP QL NAA+PROBE: NOT DETECTED
INTERNAL PROCEDURAL CONTROLS ACCEPTABLE: YES
P AXIS (DEGREES): 46
PR-INTERVAL (MSEC): 198
QRS-INTERVAL (MSEC): 84
QT-INTERVAL (MSEC): 366
QTC: 445
R AXIS (DEGREES): -76
REPORT TEXT: NORMAL
RSV AG NPH QL IA.RAPID: NOT DETECTED
S PYO AG THROAT QL IA.RAPID: NEGATIVE
SARS-COV-2 RNA RESP QL NAA+PROBE: NOT DETECTED
SERVICE CMNT-IMP: NORMAL
SERVICE CMNT-IMP: NORMAL
T AXIS (DEGREES): 34
VENTRICULAR RATE EKG/MIN (BPM): 89

## 2022-12-14 PROCEDURE — 0241U COVID/FLU/RSV PANEL: CPT | Performed by: PHYSICIAN ASSISTANT

## 2022-12-14 PROCEDURE — 10002801 HB RX 250 W/O HCPCS: Performed by: PHYSICIAN ASSISTANT

## 2022-12-14 PROCEDURE — 71046 X-RAY EXAM CHEST 2 VIEWS: CPT

## 2022-12-14 PROCEDURE — C9803 HOPD COVID-19 SPEC COLLECT: HCPCS

## 2022-12-14 PROCEDURE — 99285 EMERGENCY DEPT VISIT HI MDM: CPT

## 2022-12-14 PROCEDURE — 93010 ELECTROCARDIOGRAM REPORT: CPT | Performed by: INTERNAL MEDICINE

## 2022-12-14 PROCEDURE — 93005 ELECTROCARDIOGRAM TRACING: CPT | Performed by: PHYSICIAN ASSISTANT

## 2022-12-14 PROCEDURE — 87880 STREP A ASSAY W/OPTIC: CPT | Performed by: PHYSICIAN ASSISTANT

## 2022-12-14 PROCEDURE — 94640 AIRWAY INHALATION TREATMENT: CPT

## 2022-12-14 RX ORDER — AMOXICILLIN AND CLAVULANATE POTASSIUM 875; 125 MG/1; MG/1
1 TABLET, FILM COATED ORAL 2 TIMES DAILY
Qty: 20 TABLET | Refills: 0 | Status: SHIPPED | OUTPATIENT
Start: 2022-12-14 | End: 2022-12-24

## 2022-12-14 RX ORDER — IPRATROPIUM BROMIDE AND ALBUTEROL SULFATE 2.5; .5 MG/3ML; MG/3ML
3 SOLUTION RESPIRATORY (INHALATION) ONCE
Status: COMPLETED | OUTPATIENT
Start: 2022-12-14 | End: 2022-12-14

## 2022-12-14 RX ADMIN — Medication 15 ML: at 12:50

## 2022-12-14 RX ADMIN — IPRATROPIUM BROMIDE AND ALBUTEROL SULFATE 3 ML: .5; 3 SOLUTION RESPIRATORY (INHALATION) at 10:37

## 2022-12-14 ASSESSMENT — PAIN SCALES - GENERAL: PAINLEVEL_OUTOF10: 6

## 2023-02-21 ENCOUNTER — WALK IN (OUTPATIENT)
Dept: URGENT CARE | Age: 68
End: 2023-02-21

## 2023-02-21 VITALS
DIASTOLIC BLOOD PRESSURE: 76 MMHG | SYSTOLIC BLOOD PRESSURE: 163 MMHG | HEIGHT: 70 IN | WEIGHT: 234 LBS | RESPIRATION RATE: 18 BRPM | HEART RATE: 76 BPM | TEMPERATURE: 97.7 F | BODY MASS INDEX: 33.5 KG/M2

## 2023-02-21 DIAGNOSIS — H66.92 LEFT OTITIS MEDIA, UNSPECIFIED OTITIS MEDIA TYPE: Primary | ICD-10-CM

## 2023-02-21 PROCEDURE — 99213 OFFICE O/P EST LOW 20 MIN: CPT | Performed by: NURSE PRACTITIONER

## 2023-02-21 RX ORDER — AMOXICILLIN AND CLAVULANATE POTASSIUM 875; 125 MG/1; MG/1
1 TABLET, FILM COATED ORAL EVERY 12 HOURS
Qty: 20 TABLET | Refills: 0 | Status: SHIPPED | OUTPATIENT
Start: 2023-02-21 | End: 2023-03-03

## 2023-02-21 RX ORDER — GUAIFENESIN AND DEXTROMETHORPHAN HYDROBROMIDE 1200; 60 MG/1; MG/1
1 TABLET, EXTENDED RELEASE ORAL 2 TIMES DAILY PRN
Qty: 28 TABLET | COMMUNITY
Start: 2023-02-21

## 2023-02-21 ASSESSMENT — ENCOUNTER SYMPTOMS
SINUS PRESSURE: 0
CHILLS: 0
EYES NEGATIVE: 1
GASTROINTESTINAL NEGATIVE: 1
NEUROLOGICAL NEGATIVE: 1
HEMATOLOGIC/LYMPHATIC NEGATIVE: 1
PSYCHIATRIC NEGATIVE: 1
SINUS PAIN: 0
COUGH: 1
SORE THROAT: 0
HEADACHES: 0
FEVER: 0
RHINORRHEA: 0
ENDOCRINE NEGATIVE: 1

## 2023-02-27 ENCOUNTER — WALK IN (OUTPATIENT)
Dept: URGENT CARE | Age: 68
End: 2023-02-27

## 2023-02-27 VITALS
WEIGHT: 234 LBS | DIASTOLIC BLOOD PRESSURE: 70 MMHG | HEIGHT: 70 IN | SYSTOLIC BLOOD PRESSURE: 140 MMHG | HEART RATE: 70 BPM | TEMPERATURE: 97.7 F | RESPIRATION RATE: 18 BRPM | BODY MASS INDEX: 33.5 KG/M2

## 2023-02-27 DIAGNOSIS — H65.91 FLUID LEVEL BEHIND TYMPANIC MEMBRANE OF RIGHT EAR: ICD-10-CM

## 2023-02-27 DIAGNOSIS — H60.312 ACUTE DIFFUSE OTITIS EXTERNA OF LEFT EAR: Primary | ICD-10-CM

## 2023-02-27 PROBLEM — I71.21 ASCENDING AORTIC ANEURYSM (CMD): Status: ACTIVE | Noted: 2021-03-30

## 2023-02-27 PROBLEM — R80.9 TYPE 2 DIABETES MELLITUS WITH MICROALBUMINURIA, WITHOUT LONG-TERM CURRENT USE OF INSULIN (CMD): Status: ACTIVE | Noted: 2021-10-15

## 2023-02-27 PROBLEM — E11.29 TYPE 2 DIABETES MELLITUS WITH MICROALBUMINURIA, WITHOUT LONG-TERM CURRENT USE OF INSULIN (CMD): Status: ACTIVE | Noted: 2021-10-15

## 2023-02-27 PROBLEM — I25.118 CORONARY ARTERY DISEASE OF NATIVE ARTERY OF NATIVE HEART WITH STABLE ANGINA PECTORIS (CMD): Status: ACTIVE | Noted: 2021-10-15

## 2023-02-27 PROBLEM — E66.9 OBESITY, CLASS II, BMI 35-39.9: Status: ACTIVE | Noted: 2019-10-31

## 2023-02-27 PROCEDURE — 3077F SYST BP >= 140 MM HG: CPT | Performed by: NURSE PRACTITIONER

## 2023-02-27 PROCEDURE — 3078F DIAST BP <80 MM HG: CPT | Performed by: NURSE PRACTITIONER

## 2023-02-27 PROCEDURE — 99213 OFFICE O/P EST LOW 20 MIN: CPT | Performed by: NURSE PRACTITIONER

## 2023-02-27 RX ORDER — IBUPROFEN 600 MG/1
600 TABLET ORAL EVERY 6 HOURS PRN
Qty: 20 TABLET | Refills: 0 | Status: SHIPPED | OUTPATIENT
Start: 2023-02-27

## 2023-02-27 RX ORDER — CIPROFLOXACIN AND DEXAMETHASONE 3; 1 MG/ML; MG/ML
4 SUSPENSION/ DROPS AURICULAR (OTIC) 2 TIMES DAILY
Qty: 7.5 ML | Refills: 0 | Status: SHIPPED | OUTPATIENT
Start: 2023-02-27

## 2023-02-27 ASSESSMENT — ENCOUNTER SYMPTOMS
EYES NEGATIVE: 1
ALLERGIC/IMMUNOLOGIC NEGATIVE: 1
FATIGUE: 1
GASTROINTESTINAL NEGATIVE: 1
NEUROLOGICAL NEGATIVE: 1
RESPIRATORY NEGATIVE: 1
RHINORRHEA: 1
PSYCHIATRIC NEGATIVE: 1

## 2024-07-05 RX ORDER — HEPARIN SODIUM 5000 [USP'U]/ML
5000 INJECTION, SOLUTION INTRAVENOUS; SUBCUTANEOUS ONCE
Status: CANCELLED | OUTPATIENT
Start: 2024-07-05 | End: 2024-07-05

## 2024-07-12 ENCOUNTER — LABORATORY ENCOUNTER (OUTPATIENT)
Dept: LAB | Age: 69
End: 2024-07-12
Attending: UROLOGY
Payer: COMMERCIAL

## 2024-07-12 DIAGNOSIS — Z01.818 PRE-OP TESTING: ICD-10-CM

## 2024-07-12 LAB
ANION GAP SERPL CALC-SCNC: 6 MMOL/L (ref 0–18)
ANTIBODY SCREEN: NEGATIVE
BUN BLD-MCNC: 12 MG/DL (ref 9–23)
BUN/CREAT SERPL: 12.4 (ref 10–20)
CALCIUM BLD-MCNC: 9.8 MG/DL (ref 8.7–10.4)
CHLORIDE SERPL-SCNC: 106 MMOL/L (ref 98–112)
CO2 SERPL-SCNC: 28 MMOL/L (ref 21–32)
CREAT BLD-MCNC: 0.97 MG/DL
DEPRECATED RDW RBC AUTO: 43.8 FL (ref 35.1–46.3)
EGFRCR SERPLBLD CKD-EPI 2021: 85 ML/MIN/1.73M2 (ref 60–?)
ERYTHROCYTE [DISTWIDTH] IN BLOOD BY AUTOMATED COUNT: 13 % (ref 11–15)
FASTING STATUS PATIENT QL REPORTED: YES
GLUCOSE BLD-MCNC: 125 MG/DL (ref 70–99)
HCT VFR BLD AUTO: 41 %
HGB BLD-MCNC: 13.6 G/DL
MCH RBC QN AUTO: 30.4 PG (ref 26–34)
MCHC RBC AUTO-ENTMCNC: 33.2 G/DL (ref 31–37)
MCV RBC AUTO: 91.5 FL
OSMOLALITY SERPL CALC.SUM OF ELEC: 291 MOSM/KG (ref 275–295)
PLATELET # BLD AUTO: 296 10(3)UL (ref 150–450)
POTASSIUM SERPL-SCNC: 4.7 MMOL/L (ref 3.5–5.1)
RBC # BLD AUTO: 4.48 X10(6)UL
RH BLOOD TYPE: POSITIVE
SODIUM SERPL-SCNC: 140 MMOL/L (ref 136–145)
WBC # BLD AUTO: 7.3 X10(3) UL (ref 4–11)

## 2024-07-12 PROCEDURE — 86850 RBC ANTIBODY SCREEN: CPT

## 2024-07-12 PROCEDURE — 86901 BLOOD TYPING SEROLOGIC RH(D): CPT

## 2024-07-12 PROCEDURE — 85027 COMPLETE CBC AUTOMATED: CPT

## 2024-07-12 PROCEDURE — 86900 BLOOD TYPING SEROLOGIC ABO: CPT

## 2024-07-12 PROCEDURE — 36415 COLL VENOUS BLD VENIPUNCTURE: CPT

## 2024-07-12 PROCEDURE — 80048 BASIC METABOLIC PNL TOTAL CA: CPT

## 2024-07-18 ENCOUNTER — ANESTHESIA EVENT (OUTPATIENT)
Dept: SURGERY | Facility: HOSPITAL | Age: 69
End: 2024-07-18
Payer: COMMERCIAL

## 2024-07-24 ENCOUNTER — HOSPITAL ENCOUNTER (OUTPATIENT)
Facility: HOSPITAL | Age: 69
Setting detail: OBSERVATION
Discharge: HOME | DRG: 708 | End: 2024-07-26
Attending: UROLOGY | Admitting: UROLOGY
Payer: COMMERCIAL

## 2024-07-24 ENCOUNTER — HOSPITAL ENCOUNTER (INPATIENT)
Facility: HOSPITAL | Age: 69
LOS: 1 days | Discharge: HOME | End: 2024-07-26
Attending: UROLOGY | Admitting: UROLOGY
Payer: COMMERCIAL

## 2024-07-24 ENCOUNTER — ANESTHESIA (OUTPATIENT)
Dept: SURGERY | Facility: HOSPITAL | Age: 69
End: 2024-07-24
Payer: COMMERCIAL

## 2024-07-24 DIAGNOSIS — Z01.818 PRE-OP TESTING: Primary | ICD-10-CM

## 2024-07-24 PROBLEM — C61 PROSTATE CANCER (HCC): Status: ACTIVE | Noted: 2024-07-24

## 2024-07-24 LAB
ALBUMIN SERPL-MCNC: 4.2 G/DL (ref 3.2–4.8)
ALP LIVER SERPL-CCNC: 73 U/L
ALT SERPL-CCNC: 22 U/L
ANION GAP SERPL CALC-SCNC: 5 MMOL/L (ref 0–18)
APTT PPP: 28.3 SECONDS (ref 23–36)
AST SERPL-CCNC: 20 U/L (ref ?–34)
BILIRUB DIRECT SERPL-MCNC: 0.2 MG/DL (ref ?–0.3)
BILIRUB SERPL-MCNC: 0.6 MG/DL (ref 0.2–1.1)
BUN BLD-MCNC: 12 MG/DL (ref 9–23)
CALCIUM BLD-MCNC: 8.8 MG/DL (ref 8.7–10.4)
CHLORIDE SERPL-SCNC: 105 MMOL/L (ref 98–112)
CO2 SERPL-SCNC: 25 MMOL/L (ref 21–32)
CREAT BLD-MCNC: 1.14 MG/DL
EGFRCR SERPLBLD CKD-EPI 2021: 70 ML/MIN/1.73M2 (ref 60–?)
ERYTHROCYTE [DISTWIDTH] IN BLOOD BY AUTOMATED COUNT: 13.6 %
GLUCOSE BLD-MCNC: 163 MG/DL (ref 70–99)
GLUCOSE BLD-MCNC: 175 MG/DL (ref 70–99)
GLUCOSE BLD-MCNC: 193 MG/DL (ref 70–99)
GLUCOSE BLD-MCNC: 194 MG/DL (ref 70–99)
GLUCOSE BLD-MCNC: 196 MG/DL (ref 70–99)
HCT VFR BLD AUTO: 39.1 %
HGB BLD-MCNC: 12.7 G/DL
INR BLD: 1 (ref 0.8–1.2)
MCH RBC QN AUTO: 30.3 PG (ref 26–34)
MCHC RBC AUTO-ENTMCNC: 32.5 G/DL (ref 31–37)
MCV RBC AUTO: 93.3 FL
OSMOLALITY SERPL CALC.SUM OF ELEC: 285 MOSM/KG (ref 275–295)
PLATELET # BLD AUTO: 229 10(3)UL (ref 150–450)
POTASSIUM SERPL-SCNC: 4.4 MMOL/L (ref 3.5–5.1)
PROT SERPL-MCNC: 6.5 G/DL (ref 5.7–8.2)
PROTHROMBIN TIME: 13.2 SECONDS (ref 11.6–14.8)
RBC # BLD AUTO: 4.19 X10(6)UL
SODIUM SERPL-SCNC: 135 MMOL/L (ref 136–145)
WBC # BLD AUTO: 11.2 X10(3) UL (ref 4–11)

## 2024-07-24 PROCEDURE — 3E0T3BZ INTRODUCTION OF ANESTHETIC AGENT INTO PERIPHERAL NERVES AND PLEXI, PERCUTANEOUS APPROACH: ICD-10-PCS | Performed by: ANESTHESIOLOGY

## 2024-07-24 PROCEDURE — 82962 GLUCOSE BLOOD TEST: CPT

## 2024-07-24 PROCEDURE — 0TQD4ZZ REPAIR URETHRA, PERCUTANEOUS ENDOSCOPIC APPROACH: ICD-10-PCS | Performed by: UROLOGY

## 2024-07-24 PROCEDURE — 88305 TISSUE EXAM BY PATHOLOGIST: CPT | Performed by: UROLOGY

## 2024-07-24 PROCEDURE — 85027 COMPLETE CBC AUTOMATED: CPT | Performed by: UROLOGY

## 2024-07-24 PROCEDURE — 88307 TISSUE EXAM BY PATHOLOGIST: CPT | Performed by: UROLOGY

## 2024-07-24 PROCEDURE — 80048 BASIC METABOLIC PNL TOTAL CA: CPT | Performed by: UROLOGY

## 2024-07-24 PROCEDURE — 85610 PROTHROMBIN TIME: CPT | Performed by: UROLOGY

## 2024-07-24 PROCEDURE — 88309 TISSUE EXAM BY PATHOLOGIST: CPT | Performed by: UROLOGY

## 2024-07-24 PROCEDURE — 85730 THROMBOPLASTIN TIME PARTIAL: CPT | Performed by: UROLOGY

## 2024-07-24 PROCEDURE — 80076 HEPATIC FUNCTION PANEL: CPT | Performed by: UROLOGY

## 2024-07-24 PROCEDURE — 0VT04ZZ RESECTION OF PROSTATE, PERCUTANEOUS ENDOSCOPIC APPROACH: ICD-10-PCS | Performed by: UROLOGY

## 2024-07-24 PROCEDURE — 07BC4ZZ EXCISION OF PELVIS LYMPHATIC, PERCUTANEOUS ENDOSCOPIC APPROACH: ICD-10-PCS | Performed by: UROLOGY

## 2024-07-24 PROCEDURE — 8E0W4CZ ROBOTIC ASSISTED PROCEDURE OF TRUNK REGION, PERCUTANEOUS ENDOSCOPIC APPROACH: ICD-10-PCS | Performed by: UROLOGY

## 2024-07-24 PROCEDURE — 76942 ECHO GUIDE FOR BIOPSY: CPT | Performed by: ANESTHESIOLOGY

## 2024-07-24 RX ORDER — HYDROCODONE BITARTRATE AND ACETAMINOPHEN 5; 325 MG/1; MG/1
2 TABLET ORAL ONCE AS NEEDED
Status: DISCONTINUED | OUTPATIENT
Start: 2024-07-24 | End: 2024-07-24 | Stop reason: HOSPADM

## 2024-07-24 RX ORDER — HYDROMORPHONE HYDROCHLORIDE 1 MG/ML
0.4 INJECTION, SOLUTION INTRAMUSCULAR; INTRAVENOUS; SUBCUTANEOUS EVERY 2 HOUR PRN
Status: DISCONTINUED | OUTPATIENT
Start: 2024-07-24 | End: 2024-07-26

## 2024-07-24 RX ORDER — LABETALOL 200 MG/1
200 TABLET, FILM COATED ORAL 2 TIMES DAILY
Status: DISCONTINUED | OUTPATIENT
Start: 2024-07-24 | End: 2024-07-26

## 2024-07-24 RX ORDER — ACETAMINOPHEN 500 MG
1000 TABLET ORAL ONCE
Status: DISCONTINUED | OUTPATIENT
Start: 2024-07-24 | End: 2024-07-24 | Stop reason: HOSPADM

## 2024-07-24 RX ORDER — NICOTINE POLACRILEX 4 MG
15 LOZENGE BUCCAL
Status: DISCONTINUED | OUTPATIENT
Start: 2024-07-24 | End: 2024-07-24 | Stop reason: HOSPADM

## 2024-07-24 RX ORDER — POLYETHYLENE GLYCOL 3350 17 G/17G
17 POWDER, FOR SOLUTION ORAL DAILY PRN
Status: DISCONTINUED | OUTPATIENT
Start: 2024-07-24 | End: 2024-07-26

## 2024-07-24 RX ORDER — ROCURONIUM BROMIDE 10 MG/ML
INJECTION, SOLUTION INTRAVENOUS AS NEEDED
Status: DISCONTINUED | OUTPATIENT
Start: 2024-07-24 | End: 2024-07-24 | Stop reason: SURG

## 2024-07-24 RX ORDER — AMLODIPINE BESYLATE 5 MG/1
10 TABLET ORAL DAILY
Status: DISCONTINUED | OUTPATIENT
Start: 2024-07-24 | End: 2024-07-26

## 2024-07-24 RX ORDER — HYDROCODONE BITARTRATE AND ACETAMINOPHEN 5; 325 MG/1; MG/1
1 TABLET ORAL ONCE AS NEEDED
Status: DISCONTINUED | OUTPATIENT
Start: 2024-07-24 | End: 2024-07-24 | Stop reason: HOSPADM

## 2024-07-24 RX ORDER — NEOSTIGMINE METHYLSULFATE 1 MG/ML
INJECTION INTRAVENOUS AS NEEDED
Status: DISCONTINUED | OUTPATIENT
Start: 2024-07-24 | End: 2024-07-24 | Stop reason: SURG

## 2024-07-24 RX ORDER — GLYCOPYRROLATE 0.2 MG/ML
INJECTION, SOLUTION INTRAMUSCULAR; INTRAVENOUS AS NEEDED
Status: DISCONTINUED | OUTPATIENT
Start: 2024-07-24 | End: 2024-07-24 | Stop reason: SURG

## 2024-07-24 RX ORDER — CEFAZOLIN SODIUM 1 G/3ML
INJECTION, POWDER, FOR SOLUTION INTRAMUSCULAR; INTRAVENOUS AS NEEDED
Status: DISCONTINUED | OUTPATIENT
Start: 2024-07-24 | End: 2024-07-24 | Stop reason: SURG

## 2024-07-24 RX ORDER — HYDROMORPHONE HYDROCHLORIDE 1 MG/ML
0.2 INJECTION, SOLUTION INTRAMUSCULAR; INTRAVENOUS; SUBCUTANEOUS EVERY 5 MIN PRN
Status: DISCONTINUED | OUTPATIENT
Start: 2024-07-24 | End: 2024-07-24 | Stop reason: HOSPADM

## 2024-07-24 RX ORDER — ACETAMINOPHEN 500 MG
1000 TABLET ORAL ONCE AS NEEDED
Status: DISCONTINUED | OUTPATIENT
Start: 2024-07-24 | End: 2024-07-24 | Stop reason: HOSPADM

## 2024-07-24 RX ORDER — DEXTROSE MONOHYDRATE 25 G/50ML
50 INJECTION, SOLUTION INTRAVENOUS
Status: DISCONTINUED | OUTPATIENT
Start: 2024-07-24 | End: 2024-07-24 | Stop reason: HOSPADM

## 2024-07-24 RX ORDER — LIDOCAINE HYDROCHLORIDE ANHYDROUS AND DEXTROSE MONOHYDRATE .8; 5 G/100ML; G/100ML
INJECTION, SOLUTION INTRAVENOUS CONTINUOUS PRN
Status: DISCONTINUED | OUTPATIENT
Start: 2024-07-24 | End: 2024-07-24 | Stop reason: SURG

## 2024-07-24 RX ORDER — NICOTINE POLACRILEX 4 MG
30 LOZENGE BUCCAL
Status: DISCONTINUED | OUTPATIENT
Start: 2024-07-24 | End: 2024-07-24 | Stop reason: HOSPADM

## 2024-07-24 RX ORDER — SENNOSIDES 8.6 MG
17.2 TABLET ORAL NIGHTLY
Status: DISCONTINUED | OUTPATIENT
Start: 2024-07-24 | End: 2024-07-26

## 2024-07-24 RX ORDER — HYDROMORPHONE HYDROCHLORIDE 1 MG/ML
0.4 INJECTION, SOLUTION INTRAMUSCULAR; INTRAVENOUS; SUBCUTANEOUS EVERY 5 MIN PRN
Status: DISCONTINUED | OUTPATIENT
Start: 2024-07-24 | End: 2024-07-24 | Stop reason: HOSPADM

## 2024-07-24 RX ORDER — ATORVASTATIN CALCIUM 10 MG/1
10 TABLET, FILM COATED ORAL NIGHTLY
Status: DISCONTINUED | OUTPATIENT
Start: 2024-07-24 | End: 2024-07-26

## 2024-07-24 RX ORDER — METOCLOPRAMIDE HYDROCHLORIDE 5 MG/ML
10 INJECTION INTRAMUSCULAR; INTRAVENOUS EVERY 8 HOURS PRN
Status: DISCONTINUED | OUTPATIENT
Start: 2024-07-24 | End: 2024-07-26

## 2024-07-24 RX ORDER — SODIUM CHLORIDE, SODIUM LACTATE, POTASSIUM CHLORIDE, CALCIUM CHLORIDE 600; 310; 30; 20 MG/100ML; MG/100ML; MG/100ML; MG/100ML
INJECTION, SOLUTION INTRAVENOUS CONTINUOUS
Status: DISCONTINUED | OUTPATIENT
Start: 2024-07-24 | End: 2024-07-24 | Stop reason: HOSPADM

## 2024-07-24 RX ORDER — OXYCODONE HYDROCHLORIDE 5 MG/1
5 TABLET ORAL EVERY 4 HOURS PRN
Status: DISCONTINUED | OUTPATIENT
Start: 2024-07-24 | End: 2024-07-26

## 2024-07-24 RX ORDER — DEXAMETHASONE SODIUM PHOSPHATE 4 MG/ML
VIAL (ML) INJECTION AS NEEDED
Status: DISCONTINUED | OUTPATIENT
Start: 2024-07-24 | End: 2024-07-24 | Stop reason: SURG

## 2024-07-24 RX ORDER — SODIUM CHLORIDE, SODIUM LACTATE, POTASSIUM CHLORIDE, CALCIUM CHLORIDE 600; 310; 30; 20 MG/100ML; MG/100ML; MG/100ML; MG/100ML
INJECTION, SOLUTION INTRAVENOUS CONTINUOUS
Status: DISCONTINUED | OUTPATIENT
Start: 2024-07-24 | End: 2024-07-24

## 2024-07-24 RX ORDER — HYDROMORPHONE HYDROCHLORIDE 1 MG/ML
INJECTION, SOLUTION INTRAMUSCULAR; INTRAVENOUS; SUBCUTANEOUS
Status: COMPLETED
Start: 2024-07-24 | End: 2024-07-24

## 2024-07-24 RX ORDER — KETAMINE HYDROCHLORIDE 50 MG/ML
INJECTION, SOLUTION INTRAMUSCULAR; INTRAVENOUS AS NEEDED
Status: DISCONTINUED | OUTPATIENT
Start: 2024-07-24 | End: 2024-07-24 | Stop reason: SURG

## 2024-07-24 RX ORDER — HYDROMORPHONE HYDROCHLORIDE 1 MG/ML
0.2 INJECTION, SOLUTION INTRAMUSCULAR; INTRAVENOUS; SUBCUTANEOUS EVERY 2 HOUR PRN
Status: DISCONTINUED | OUTPATIENT
Start: 2024-07-24 | End: 2024-07-26

## 2024-07-24 RX ORDER — LOVASTATIN 40 MG/1
40 TABLET ORAL NIGHTLY
COMMUNITY

## 2024-07-24 RX ORDER — FLUTICASONE PROPIONATE 50 MCG
2 SPRAY, SUSPENSION (ML) NASAL DAILY
COMMUNITY

## 2024-07-24 RX ORDER — HYDROMORPHONE HYDROCHLORIDE 1 MG/ML
0.6 INJECTION, SOLUTION INTRAMUSCULAR; INTRAVENOUS; SUBCUTANEOUS EVERY 5 MIN PRN
Status: DISCONTINUED | OUTPATIENT
Start: 2024-07-24 | End: 2024-07-24 | Stop reason: HOSPADM

## 2024-07-24 RX ORDER — HYDROCHLOROTHIAZIDE 25 MG/1
25 TABLET ORAL
Status: DISCONTINUED | OUTPATIENT
Start: 2024-07-24 | End: 2024-07-24

## 2024-07-24 RX ORDER — METOCLOPRAMIDE HYDROCHLORIDE 5 MG/ML
10 INJECTION INTRAMUSCULAR; INTRAVENOUS EVERY 8 HOURS PRN
Status: DISCONTINUED | OUTPATIENT
Start: 2024-07-24 | End: 2024-07-24 | Stop reason: HOSPADM

## 2024-07-24 RX ORDER — SODIUM CHLORIDE 9 MG/ML
INJECTION, SOLUTION INTRAVENOUS CONTINUOUS
Status: DISCONTINUED | OUTPATIENT
Start: 2024-07-24 | End: 2024-07-26

## 2024-07-24 RX ORDER — NALOXONE HYDROCHLORIDE 0.4 MG/ML
0.08 INJECTION, SOLUTION INTRAMUSCULAR; INTRAVENOUS; SUBCUTANEOUS AS NEEDED
Status: DISCONTINUED | OUTPATIENT
Start: 2024-07-24 | End: 2024-07-24 | Stop reason: HOSPADM

## 2024-07-24 RX ORDER — BUPIVACAINE HYDROCHLORIDE 2.5 MG/ML
INJECTION, SOLUTION EPIDURAL; INFILTRATION; INTRACAUDAL AS NEEDED
Status: DISCONTINUED | OUTPATIENT
Start: 2024-07-24 | End: 2024-07-24 | Stop reason: SURG

## 2024-07-24 RX ORDER — PHENYLEPHRINE HCL 10 MG/ML
VIAL (ML) INJECTION AS NEEDED
Status: DISCONTINUED | OUTPATIENT
Start: 2024-07-24 | End: 2024-07-24 | Stop reason: SURG

## 2024-07-24 RX ORDER — MIDAZOLAM HYDROCHLORIDE 1 MG/ML
INJECTION INTRAMUSCULAR; INTRAVENOUS AS NEEDED
Status: DISCONTINUED | OUTPATIENT
Start: 2024-07-24 | End: 2024-07-24 | Stop reason: SURG

## 2024-07-24 RX ORDER — ENOXAPARIN SODIUM 100 MG/ML
40 INJECTION SUBCUTANEOUS DAILY
Status: DISCONTINUED | OUTPATIENT
Start: 2024-07-25 | End: 2024-07-26

## 2024-07-24 RX ORDER — ONDANSETRON 2 MG/ML
4 INJECTION INTRAMUSCULAR; INTRAVENOUS EVERY 6 HOURS PRN
Status: DISCONTINUED | OUTPATIENT
Start: 2024-07-24 | End: 2024-07-24 | Stop reason: HOSPADM

## 2024-07-24 RX ORDER — ENOXAPARIN SODIUM 100 MG/ML
40 INJECTION SUBCUTANEOUS ONCE
Status: COMPLETED | OUTPATIENT
Start: 2024-07-24 | End: 2024-07-24

## 2024-07-24 RX ORDER — ONDANSETRON 2 MG/ML
INJECTION INTRAMUSCULAR; INTRAVENOUS AS NEEDED
Status: DISCONTINUED | OUTPATIENT
Start: 2024-07-24 | End: 2024-07-24 | Stop reason: SURG

## 2024-07-24 RX ORDER — ONDANSETRON 2 MG/ML
4 INJECTION INTRAMUSCULAR; INTRAVENOUS EVERY 6 HOURS PRN
Status: DISCONTINUED | OUTPATIENT
Start: 2024-07-24 | End: 2024-07-26

## 2024-07-24 RX ORDER — INSULIN ASPART 100 [IU]/ML
INJECTION, SOLUTION INTRAVENOUS; SUBCUTANEOUS ONCE
Status: DISCONTINUED | OUTPATIENT
Start: 2024-07-24 | End: 2024-07-24 | Stop reason: HOSPADM

## 2024-07-24 RX ORDER — LOSARTAN POTASSIUM 100 MG/1
100 TABLET ORAL DAILY
Status: DISCONTINUED | OUTPATIENT
Start: 2024-07-24 | End: 2024-07-26

## 2024-07-24 RX ORDER — OXYBUTYNIN CHLORIDE 5 MG/1
5 TABLET ORAL EVERY 6 HOURS PRN
Status: DISCONTINUED | OUTPATIENT
Start: 2024-07-24 | End: 2024-07-26

## 2024-07-24 RX ORDER — DOCUSATE SODIUM 100 MG/1
100 CAPSULE, LIQUID FILLED ORAL 2 TIMES DAILY
Status: DISCONTINUED | OUTPATIENT
Start: 2024-07-24 | End: 2024-07-26

## 2024-07-24 RX ADMIN — ONDANSETRON 4 MG: 2 INJECTION INTRAMUSCULAR; INTRAVENOUS at 12:15:00

## 2024-07-24 RX ADMIN — ROCURONIUM BROMIDE 10 MG: 10 INJECTION, SOLUTION INTRAVENOUS at 10:32:00

## 2024-07-24 RX ADMIN — NEOSTIGMINE METHYLSULFATE 5 MG: 1 INJECTION INTRAVENOUS at 12:34:00

## 2024-07-24 RX ADMIN — MIDAZOLAM HYDROCHLORIDE 2 MG: 1 INJECTION INTRAMUSCULAR; INTRAVENOUS at 08:00:00

## 2024-07-24 RX ADMIN — SODIUM CHLORIDE, SODIUM LACTATE, POTASSIUM CHLORIDE, CALCIUM CHLORIDE: 600; 310; 30; 20 INJECTION, SOLUTION INTRAVENOUS at 12:17:00

## 2024-07-24 RX ADMIN — PHENYLEPHRINE HCL 100 MCG: 10 MG/ML VIAL (ML) INJECTION at 12:18:00

## 2024-07-24 RX ADMIN — GLYCOPYRROLATE 0.8 MG: 0.2 INJECTION, SOLUTION INTRAMUSCULAR; INTRAVENOUS at 12:34:00

## 2024-07-24 RX ADMIN — ROCURONIUM BROMIDE 10 MG: 10 INJECTION, SOLUTION INTRAVENOUS at 09:22:00

## 2024-07-24 RX ADMIN — CEFAZOLIN SODIUM 2 G: 1 INJECTION, POWDER, FOR SOLUTION INTRAMUSCULAR; INTRAVENOUS at 12:15:00

## 2024-07-24 RX ADMIN — SODIUM CHLORIDE, SODIUM LACTATE, POTASSIUM CHLORIDE, CALCIUM CHLORIDE: 600; 310; 30; 20 INJECTION, SOLUTION INTRAVENOUS at 08:02:00

## 2024-07-24 RX ADMIN — ROCURONIUM BROMIDE 10 MG: 10 INJECTION, SOLUTION INTRAVENOUS at 11:58:00

## 2024-07-24 RX ADMIN — GLYCOPYRROLATE 0.2 MG: 0.2 INJECTION, SOLUTION INTRAMUSCULAR; INTRAVENOUS at 09:21:00

## 2024-07-24 RX ADMIN — ROCURONIUM BROMIDE 50 MG: 10 INJECTION, SOLUTION INTRAVENOUS at 08:02:00

## 2024-07-24 RX ADMIN — LIDOCAINE HYDROCHLORIDE ANHYDROUS AND DEXTROSE MONOHYDRATE 1.5 MG/KG/HR: .8; 5 INJECTION, SOLUTION INTRAVENOUS at 08:21:00

## 2024-07-24 RX ADMIN — ROCURONIUM BROMIDE 10 MG: 10 INJECTION, SOLUTION INTRAVENOUS at 10:00:00

## 2024-07-24 RX ADMIN — SODIUM CHLORIDE, SODIUM LACTATE, POTASSIUM CHLORIDE, CALCIUM CHLORIDE: 600; 310; 30; 20 INJECTION, SOLUTION INTRAVENOUS at 08:08:00

## 2024-07-24 RX ADMIN — PHENYLEPHRINE HCL 100 MCG: 10 MG/ML VIAL (ML) INJECTION at 12:24:00

## 2024-07-24 RX ADMIN — BUPIVACAINE HYDROCHLORIDE 60 ML: 2.5 INJECTION, SOLUTION EPIDURAL; INFILTRATION; INTRACAUDAL at 08:16:00

## 2024-07-24 RX ADMIN — DEXAMETHASONE SODIUM PHOSPHATE 4 MG: 4 MG/ML VIAL (ML) INJECTION at 08:24:00

## 2024-07-24 RX ADMIN — LIDOCAINE HYDROCHLORIDE ANHYDROUS AND DEXTROSE MONOHYDRATE 1 MG/KG/HR: .8; 5 INJECTION, SOLUTION INTRAVENOUS at 10:04:00

## 2024-07-24 RX ADMIN — ROCURONIUM BROMIDE 10 MG: 10 INJECTION, SOLUTION INTRAVENOUS at 10:59:00

## 2024-07-24 RX ADMIN — KETAMINE HYDROCHLORIDE 50 MG: 50 INJECTION, SOLUTION INTRAMUSCULAR; INTRAVENOUS at 08:08:00

## 2024-07-24 RX ADMIN — PHENYLEPHRINE HCL 100 MCG: 10 MG/ML VIAL (ML) INJECTION at 11:34:00

## 2024-07-24 NOTE — PLAN OF CARE
Pt A&Ox4, resting in bed.  Resp: 2L,   Cardiac: NSR  GI/: [place]  Activity/Safety: up w/asst  Skin: abd lap sites x4 w/glue, TYREE  Pain: oxy 5 given  Pt updated on and agreeable to POC; Austin care, strict I&O, pain mgmt

## 2024-07-24 NOTE — CONSULTS
[facility]   part of [facility]    History & Physical    Fernando Pacheco Patient Status:  Outpatient in a Bed    1955 MRN [id number]   Location [facility] POST ANESTHESIA CARE UNIT Attending Scott De La Paz MD   Hosp Day # 0 PCP Andrey Nguyen MD     Date:  2024  Date of Admission:  2024    Chief Complaint:     Robotic prostatectomy, b/l pelvic lymphadenopathy, anterior urethropexy. Post op medicine consult   Consulting physician - Dr. De La Paz      HPI:   Fernando Pacheco is a(n) 69 year old male w/ PMHx of eHTN, HLD, STEVEN, Obesity BMI 36, DM2, ascending aortic aneurysm, mildly elevated coronary calcium score, Intermediate risk prostate cancer who presented for surgery. S/p Robotic assisted laparoscopic radical prostatectomy, bilateral pelvic lymphadenectomy, anterior urethropexy with Dr. De La Paz.     Patient seen in the pacu, denies fevers, chills, cough, dyspnea, chest pains, numbness, tingling, focal weakness, he does report abd pains, 7/10, non radiating, mostly incisional, he has received fentanyl and dilaudid which has alleviated his pains, pain worse w/ motion.     History     Past Medical History:    Benign localized prostatic hyperplasia with lower urinary tract symptoms (LUTS)    Cancer (HCC)    prostate    Coronary atherosclerosis    High blood pressure    High cholesterol    Hypercholesterolemia    Kidney disease    Obesity, Class II, BMI 35-39.9    Obesity, unspecified    Other and unspecified hyperlipidemia    OTHER DISEASES    colon polyps    PONV (postoperative nausea and vomiting)    nausea    Skin cancer    squamous and basal    Type II or unspecified type diabetes mellitus without mention of complication, not stated as uncontrolled    Unspecified essential hypertension    Unspecified sleep apnea    not using CPAP    Visual impairment    glasses     Past Surgical History:   Procedure Laterality Date    Colonoscopy      due     Colonoscopy  2020    Dr Epps: adenoma,  tics, int hemorrhoids - 5 year recall    Colonoscopy,biopsy  2014    Procedure: ;  Surgeon: Micky Banegas MD;  [facility]: [facility]    Colonoscopy,remv lesn,snare N/A 2014    TEREZA Banegas. 5 adenomas, (2 large), diverticuli, hemorrhoids, repeat  w/split 4L prep.     Other surgical history      Left knee arthroscopy    Other surgical history      nasal or sinus surgery, not certain    Other surgical history  2017    cysto w/ Dr. Montalvo    Total hip replacement Right      Family History   Problem Relation Age of Onset    Hypertension Father     Other (Other) Mother         dementia    Hypertension Brother      Social History:  Social History     Socioeconomic History    Marital status: Single   Tobacco Use    Smoking status: Never    Smokeless tobacco: Current     Types: Snuff, Chew    Tobacco comments:     chews tobacco   Vaping Use    Vaping status: Never Used   Substance and Sexual Activity    Alcohol use: Yes     Comment: socially on the weekend    Drug use: No    Sexual activity: Yes     Partners: Female   Other Topics Concern    Exercise Yes    Seat Belt Yes     Social Determinants of Health     Financial Resource Strain: Not on File (9/15/2022)    Received from [employer]    Financial Resource Strain     Financial Resource Strain: 0   Food Insecurity: Not on File (9/15/2022)    Received from [employer]    Food Insecurity     Food: 0   Transportation Needs: Not on File (9/15/2022)    Received from [employer]    Transportation Needs     Transportation: 0   Physical Activity: Not on File (9/15/2022)    Received from [employer]    Physical Activity     Physical Activity: 0   Stress: Not on File (9/15/2022)    Received from [employer]    Stress     Stress: 0   Social Connections: Not on File (9/15/2022)    Received from [employer]    Social Connections     Social Connections and Isolation: 0   Housing Stability: Not on File (9/15/2022)    Received from [employer]    Housing Stability     Housin        Allergies/Medications:   Allergies:   Allergies   Allergen Reactions    Ace Inhibitors Coughing     Medications Prior to Admission   Medication Sig    fluticasone propionate 50 MCG/ACT Nasal Suspension 2 sprays by Nasal route daily.    Lovastatin 40 MG Oral Tab Take 1 tablet (40 mg total) by mouth nightly.    METFORMIN 500 MG Oral Tab TAKE 2 TABLETS(1000 MG) BY MOUTH TWICE DAILY WITH MEALS    tamsulosin (FLOMAX) cap Take 2 capsules (0.8 mg total) by mouth every evening.    Sildenafil Citrate 100 MG Oral Tab TAKE 1 TABLET BY MOUTH DAILY AS NEEDED FOR ERECTILE DYSFUNCTION    hydrochlorothiazide 25 MG Oral Tab Take 1 tablet (25 mg total) by mouth once daily.    amLODIPine Besylate 10 MG Oral Tab Take 1 tablet (10 mg total) by mouth daily.    losartan 100 MG Oral Tab Take 1 tablet (100 mg total) by mouth daily.    Labetalol HCl 200 MG Oral Tab Take 1 tablet (200 mg total) by mouth 2 (two) times daily.    Blood Glucose Monitoring Suppl (ACCU-CHEK CONNIE SMARTVIEW) w/Device Does not apply Kit Used as directed.    Glucose Blood In Vitro Strip Test twice daily three times weekly    ACCU-CHEK FASTCLIX LANCETS Does not apply Misc Pt has accu-chek connie meter.Tests twice daily    Multiple Vitamins-Minerals (MULTI-VITAMIN/MINERALS) Oral Tab Take 1 tablet by mouth daily.       Review of Systems:   A comprehensive 12 point review of systems was otherwise negative, aside from what's stated above.    Physical Exam:   Vital Signs:  Blood pressure 148/84, pulse 77, temperature 97 °F (36.1 °C), temperature source Temporal, resp. rate 14, height 5' 10\" (1.778 m), weight 255 lb (115.7 kg), SpO2 93%.     General: No acute distress. Alert and oriented x 3.  HEENT: Moist mucous membranes. EOM-I. PERRL  Neck: No lymphadenopathy.  No JVD. No carotid bruits.  Respiratory: Clear to auscultation bilaterally.  No wheezes. No rhonchi.  Cardiovascular: S1, S2.  Regular rate and rhythm.  No murmurs. Equal pulses   Abdomen: Soft, nontender,  nondistended.  Positive bowel sounds. No rebound tenderness, incisions w dermabond, shwetha w/ small amt of sanguinous op   Neurologic: No focal neurological deficits.  Musculoskeletal: Full range of motion of all extremities.  No swelling noted.  Integument: No lesions. No erythema.  Psychiatric: Appropriate mood and affect.    Results:     Lab Results   Component Value Date    WBC 7.3 07/12/2024    HGB 13.6 07/12/2024    HCT 41.0 07/12/2024    .0 07/12/2024    CREATSERUM 0.97 07/12/2024    BUN 12 07/12/2024     07/12/2024    K 4.7 07/12/2024     07/12/2024    CO2 28.0 07/12/2024     (H) 07/12/2024    CA 9.8 07/12/2024    ALB 4.2 07/24/2024    ALKPHO 73 07/24/2024    BILT 0.6 07/24/2024    TP 6.5 07/24/2024    AST 20 07/24/2024    ALT 22 07/24/2024    PTT 28.3 07/24/2024    INR 1.00 07/24/2024    TSH 4.150 12/14/2010    PSA 6.29 (H) 03/09/2021    B12 403 03/09/2021            No results found.        Assessment/Plan:     Fernando Pacheco is a(n) 69 year old male w/ PMHx of eHTN, HLD, STEVEN, Obesity BMI 36, DM2, ascending aortic aneurysm, mildly elevated coronary calcium score, Intermediate risk prostate cancer who presented for surgery. S/p Robotic assisted laparoscopic radical prostatectomy, bilateral pelvic lymphadenectomy, anterior urethropexy with Dr. De La Paz.     Intermediate risk prostate cancer    S/p Robotic assisted laparoscopic radical prostatectomy, bilateral pelvic lymphadenectomy, anterior urethropexy with Dr. De La Paz 07/24/24   H/o bph  Post op pain   - mgmt per urology  - zepeda mgmt per uro - plan to have decath trial in clinic   - check post op labs   - cont flomax   - norco prn  - iv dilaudid prn     DM2  - hold home agents  - ssi     eHTN  HLD  - norvasc 10mg/d w/ hold parameters   - hydrochlorothiazide 25mg/d - hold - resume once off iv fluids  - labetalol 200mg bid w/ hold parameters   - losartan 100mg daily w/ hold parameters   - atorva in place of lovastatin     DVT ppx: lovenox    Code Status: full    Dispo: per urology     Jesus White, DO  [facility]

## 2024-07-24 NOTE — ANESTHESIA PROCEDURE NOTES
Airway  Date/Time: 7/24/2024 8:04 AM  Urgency: elective    Airway not difficult    General Information and Staff    Patient location during procedure: OR  Anesthesiologist: Valdemar Carmona MD  Performed: anesthesiologist   Performed by: Valdemar Carmona MD  Authorized by: Valdemar Carmona MD      Indications and Patient Condition  Indications for airway management: anesthesia  Spontaneous Ventilation: absent  Sedation level: deep  Preoxygenated: yes  Patient position: sniffing  Mask difficulty assessment: 0 - not attempted    Final Airway Details  Final airway type: endotracheal airway      Successful airway: ETT  Cuffed: yes   Successful intubation technique: direct laryngoscopy  Facilitating devices/methods: rapid sequence intubation and intubating stylet  Endotracheal tube insertion site: oral  Blade: Brigid  Blade size: #4  ETT size (mm): 7.5    Cormack-Lehane Classification: grade IIA - partial view of glottis  Placement verified by: capnometry   Cuff volume (mL): 7  Measured from: lips  ETT to lips (cm): 22  Number of attempts at approach: 1  Ventilation between attempts: none  Number of other approaches attempted: 0

## 2024-07-24 NOTE — ANESTHESIA PROCEDURE NOTES
Regional Block    Date/Time: 7/24/2024 8:08 AM    Performed by: Valdemar Carmona MD  Authorized by: Valdemar Carmona MD      General Information and Staff    Start Time:  7/24/2024 8:08 AM  End Time:  7/24/2024 8:16 AM  Anesthesiologist:  Valdemar Carmona MD  Performed by:  Anesthesiologist  Patient Location:  OR    Block Placement: Post Induction  Site Identification: real time ultrasound guided and image stored and retrievable    Block site/laterality marked before start: site marked  Reason for Block: at surgeon's request and post-op pain management    Preanesthetic Checklist: 2 patient identifers, IV checked, risks and benefits discussed, monitors and equipment checked, pre-op evaluation, timeout performed, anesthesia consent, sterile technique used, no prohibitive neurological deficits and no local skin infection at insertion site      Procedure Details    Patient Position:  Supine  Prep: ChloraPrep    Monitoring:  Cardiac monitor, continuous pulse ox and blood pressure cuff  Block Type:  TAP  Laterality:  Bilateral  Injection Technique:  Single-shot    Needle    Needle Type:  Short-bevel and echogenic  Needle Gauge:  21 G  Needle Length:  100 mm  Needle Localization:  Ultrasound guidance  Reason for Ultrasound Use: appropriate spread of the medication was noted in real time and no ultrasound evidence of intravascular and/or intraneural injection            Assessment    Injection Assessment:  Good spread noted, negative resistance, negative aspiration for heme, incremental injection and low pressure  Heart Rate Change: No    - Patient tolerated block procedure well without evidence of immediate block related complications.     Medications  7/24/2024 8:08 AM      Additional Comments    Medication:  Bupivacaine 0.25% 60mL

## 2024-07-24 NOTE — ANESTHESIA PROCEDURE NOTES
Peripheral IV  Date/Time: 7/24/2024 8:08 AM  Inserted by: Valdemar Carmona MD    Placement  Needle size: 18 G  Laterality: right  Location: hand  Local anesthetic: General.  Site prep: alcohol  Technique: anatomical landmarks  Attempts: 1

## 2024-07-24 NOTE — OPERATIVE REPORT
ROBOTIC-ASSISTED LAPAROSCOPIC RADICAL PROSTATECTOMY (RALP) OPERATIVE NOTE    PATIENT NAME: Fernando Pacheco  YOB: 1955  DATE OF SERVICE: 7/24/2024    PREOPERATIVE DIAGNOSIS:  Intermediate risk prostate adenocarcinoma    POSTOPERATIVE DIAGNOSIS:  Same    PROCEDURES PERFORMED:  Robotic-assisted laparoscopic radical prostatectomy (CPT: 88507)  Bilateral pelvic lymphadenectomy  Anterior urethropexy (CPT: 89458)    SURGEON:  Scott De La Paz MD    ASSISTANTS:  Rajwinder Veronica    ANESTHESIA:  General Endotracheal    ANTIBIOTIC PROPHYLAXIS:  Ancef    SPECIMENS:  Prostate and seminal vesicles  Bilateral pelvic lymph nodes  Periprostatic fat    ESTIMATED BLOOD LOSS:  250mL    DRAINS:  16F Zepeda catheter (15mL in balloon)  15F Roge Drain    COMPLICATIONS:  No immediate complications     INDICATIONS FOR PROCEDURE:  Mr. Pacheco is a 69 year old gentleman who was found to have intermediate risk prostate adenocarcinoma.  He was counseled about treatment options, and elected to proceed with a radical prostatectomy with bilateral pelvic lymphadenectomy.  Please see my pre-operative and office notes for procedural counseling, including the risks of the procedure.     DESCRIPTION OF PROCEDURE:  After reviewing the indications for the procedure, informed consent was reviewed and signed by the patient.    The patient was brought to the operating room and placed in the supine position on the OR table.  SCD's were applied and all pressure points were carefully padded.  At this point, general endotracheal anesthesia was successfully induced.  The patient was then given perioperative antibiotics and subcutaneous enoxaparin prior to initiation of the procedure.  At this point, he remained in the supine position and his abdomen was prepped with chlorhexidine and his genitals and upper thighs were prepped with betadine solution.  He was draped in the usual sterile fashion.  A 16F zepeda catheter was placed using a sterile technique and the  balloon was inflated with 15cc of sterile water. The bladder was drained fully.  We placed the patient in a steep Trendelenburg position to ensure there was no motion of the patient on the bed. At this point, a procedural timeout was performed where the patient and procedure were correctly identified using the patient's full name and date of birth.    A Veress needle was inserted above the umbilicus into the peritoneal cavity.  A drop test was performed to ensure there was no return of blood or enteric contents.  At this point insufflation was initiated and there were acceptably low opening pressures.  The abdomen was then insufflated to 15mmHg.  At this point a transverse 8mm incision was made in the supraumbilical region through which a non-bladed 8mm trocar was inserted into the peritoneal cavity.  The camera was then passed through this port and the peritoneum was inspected. There was no evidence of intraabdominal pathology.  There were no adhesions noted.      At this point, the remainder of the working ports were placed under direct vision.  Two 8mm ports were placed in the left lower quadrant, an 8mm port was placed in the right lower quadrant, and a 12mm assistant port was placed in the far right lower quadrant.  Finally, a 5mm assistant port was placed in the right upper quadrant.  There was no evidence of injury to the intraperitoneal contents during port placement.  The patient was again placed in steep Trendelenburg position and the robot was then successfully docked.    All instruments were then placed under direct vision.  I began by taking down the attachments of the sigmoid colon to the left pelvic sidewall in order to identify the external iliac vessels and ensure mobility of the sigmoid colon and rectum.     Next, the posterior peritoneum was incised using electrocautery in order to enter the rectovesical cul-de-sac and identify the vas deferens and seminal vesicles bilaterally.  These structures  were both dissected out carefully and the vasa were transected near the tips of the seminal vesicles.  Minimal use of cautery was employed near the tips of the seminal vesicles in order to avoid any injury to the neurovascular bundles. Once the seminal vesicles were dissected free, I then created a plane between the rectum and posterior prostate using a combination of blunt and sharp dissection.  Denonvilliers fascia was identified and dissection was carried distally to the posterior apex of the prostate.      After completing the posterior dissection, attention was the turned to mobilizing the bladder.  The right medial umbilical ligament was identified and grasped.  Using electrocautery, the space of Retzius was entered.  Dissection was carried laterally to the vas deferens and distally to the endopelvic fascia.  I then performed the same procedure on the left side of the bladder.  Finally, the urachus was divided using electrocautery to drop the bladder posteriorly.     The prostate was then defatted using blunt dissection and electrocautery to fully expose the endopelvic fascia.  This fat was sent off for pathologic evaluation as \"periprostatic fat.\"  Next, the endopelvic fascia on the right was sharply entered. The levator muscles were swept laterally, with care not to injure them.  Dissection was carried towards the apex of the prostate, exposing the lateral aspect of the dorsal venous complex.  Vyas's ligament was divided with gentle bipolar electrocautery.  The same procedure was then performed on the left side.     After exposure of the dorsal venous complex, it was suture-ligated using a 2-0 V-Lock suture in a figure-of-eight fashion.  An anterior urethropexy was performed using the same suture to lift the urethra anteriorly and tack it to the periosteum of the pubic bone.      Attention was then turned to  the bladder neck from the base of the prostate.  The zepeda catheter was placed on  gentle traction to delineate the junction of the bladder and prostate and electrocautery was used to dissect the bladder neck from the prostate.  The anterior bladder neck was entered, exposing the zepeda catheter.  The zepeda catheter was then placed on anterior traction in order to facilitate the posterior bladder neck dissection.  After coming through the posterior bladder neck, the posterior dissection plane was identified, exposing the previously dissected vasa and seminal vesicles.  Care was taken to avoid the ureteral orifices.  There was efflux of clear urine identified bilaterally. The vasa and seminal vesicles were then retracted anteriorly in order to delineate the prostatic pedicles.      The prostatic pedicles were then divided in an antegrade fashion using Hem-o-lock clips.  Care was taken to avoid the use of electrocautery near the pedicles in order to prevent inadvertent injury to the neurovascular bundles. The neurovascular bundles were then swept laterally off of the prostatic fascia in order to preserve them.  This dissection was carried up to the prostatic apex. The quality of the nerve sparing was fair bilaterally.    Next, the dorsal venous complex was incised using electrocautery and sharp dissection.  After coming through the dorsal vein, hemostasis appeared to be good.  The urethra was then identified and blunt dissection was used to develop adequate urethral length. The urethra was then sharply divided anteriorly, the zepeda catheter was retracted, and the posterior urethra was sharply incised.  The prostate was then freed from the fossa and placed in the left upper quadrant for later retrieval.    After completing the prostatectomy portion of the procedure, attention was turned to the bilateral pelvic lymphadenectomy.  The right external iliac vein was identified and dissection was carried to the pelvic sidewall and down into the obturator space. The obturator nerve was identified and safely  preserved.  The lymphatic packet was controlled proximally and distally using Hem-o-lock clips to minimize the risk of postoperative pelvic lymphocele.  The same procedure was then performed on the patient's left side.  All specimens, including the prostate and seminal vesicles, were then placed in an endo-catch bag for later retrieval.     Finally, the vesicourethral anastomosis was performed in a running fashion using two 3-0 V-Lock sutures tied together.  A new 16F zepeda catheter was placed at the completion of the anastomosis and the balloon was inflated with 10cc of sterile water.  It was irrigated successfully to ensure correct positioning.  The anastomosis was tested by filling the bladder with 120cc of sterile saline and it appeared to be water-tight, without obvious leaks.      A 15F round roberto carlos drain was passed through the far left lower quadrant 8mm port site and placed near the vesicourethral anastomosis.     At this point the abdomen was desufflated and 8mm periumbilical incision was widened enough to accommodate removal of the endo-catch bag with the specimens inside.  The fascia was closed using interrupted 0 PDS sutures in a figure-of-eight fashion.  At this point the abdomen was re-insufflated and the camera was passed through the 12mm assistant port to inspect the peritoneum.  The fascial closure from the extraction site was well-approximated and there was no snared omentum or bowel.  At this point, all ports were removed under vision to assess for any bleeding.  Finally the 12mm assistant port was removed after complete desufflation of the peritoneum.     Skin was closed using 4-0 Monocryl sutures in a subcuticular fashion.  Surgical glue was placed over each incision site.      The patient was then awoken from anesthesia and transferred to the recovery room in stable condition.    He tolerated the procedure well without any complications.    At the completion of the procedure, our sponge,  instrument, and needle count was correct.    Malikalonso Veronica served as my bedside assistant for the entirety of the procedure.  I was present for the entirety of the procedure and completed all major portions myself.    PLAN:  He will be admitted to the surgical floor post-operatively for observation for 1-2 nights. His zepeda catheter will remain in place until post-operative follow-up.       Scott De La Paz MD  [facility]  Office: [phone]

## 2024-07-24 NOTE — ANESTHESIA POSTPROCEDURE EVALUATION
[facility]    Fernando Pacheco Patient Status:  Outpatient in a Bed   Age/Gender 69 year old male MRN [id number]   Location [facility] SURGERY Attending Scott De La Paz MD   Hosp Day # 0 PCP Andrey Nguyen MD       Anesthesia Post-op Note    XI ROBOT-ASSISTED LAPAROSCOPIC RADICAL PROSTATECTOMY WITH BILATERAL PELVIC LYMPHADENECTOMY    Procedure Summary       Date: 07/24/24 Room / Location:  MAIN OR 18 /  MAIN OR    Anesthesia Start: 0759 Anesthesia Stop: 1251    Procedure: XI ROBOT-ASSISTED LAPAROSCOPIC RADICAL PROSTATECTOMY WITH BILATERAL PELVIC LYMPHADENECTOMY (Abdomen) Diagnosis: (PROSTATE CANCER)    Surgeons: Scott De La Paz MD Anesthesiologist: Valdemar Carmona MD    Anesthesia Type: general ASA Status: 3            Anesthesia Type: general    Vitals Value Taken Time   /89 07/24/24 1253   Temp 97.5 07/24/24 1253   Pulse 90 07/24/24 1253   Resp 18 07/24/24 1253   SpO2 93 07/24/24 1253       Patient Location: PACU    Anesthesia Type: general    Airway Patency: patent    Postop Pain Control: adequate    Mental Status: preanesthetic baseline    Nausea/Vomiting: none    Cardiopulmonary/Hydration status: stable euvolemic    Complications: no apparent anesthesia related complications    Postop vital signs: stable    Dental Exam: Unchanged from Preop    Patient to be discharged from PACU when criteria met.

## 2024-07-24 NOTE — ANESTHESIA PREPROCEDURE EVALUATION
PRE-OP EVALUATION    Patient Name: Fernando Pacheco    Admit Diagnosis: PROSTATE CANCER    Pre-op Diagnosis: PROSTATE CANCER    XI ROBOT-ASSISTED LAPAROSCOPIC RADICAL PROSTATECTOMY WITH BILATERAL PELVIC LYMPHADENECTOMY    Anesthesia Procedure: XI ROBOT-ASSISTED LAPAROSCOPIC RADICAL PROSTATECTOMY WITH BILATERAL PELVIC LYMPHADENECTOMY    Surgeons and Role:     * Scott De La Paz MD - Primary    Pre-op vitals reviewed.  Temp: 97.6 °F (36.4 °C)  Pulse: 61  Resp: 16  BP: 138/78  SpO2: 97 %  Body mass index is 36.59 kg/m².    Current medications reviewed.  Hospital Medications:   acetaminophen (Tylenol Extra Strength) tab 1,000 mg  1,000 mg Oral Once    glucose (Dex4) 15 GM/59ML oral liquid 15 g  15 g Oral Q15 Min PRN    Or    glucose (Glutose) 40% oral gel 15 g  15 g Oral Q15 Min PRN    Or    glucose-vitamin C (Dex-4) chewable tab 4 tablet  4 tablet Oral Q15 Min PRN    Or    dextrose 50% injection 50 mL  50 mL Intravenous Q15 Min PRN    Or    glucose (Dex4) 15 GM/59ML oral liquid 30 g  30 g Oral Q15 Min PRN    Or    glucose (Glutose) 40% oral gel 30 g  30 g Oral Q15 Min PRN    Or    glucose-vitamin C (Dex-4) chewable tab 8 tablet  8 tablet Oral Q15 Min PRN    lactated ringers infusion   Intravenous Continuous    ceFAZolin (Ancef) 2g in 10mL IV syringe premix  2 g Intravenous Once    enoxaparin (Lovenox) 40 MG/0.4ML SUBQ injection 40 mg  40 mg Subcutaneous Once       Outpatient Medications:     Medications Prior to Admission   Medication Sig Dispense Refill Last Dose    fluticasone propionate 50 MCG/ACT Nasal Suspension 2 sprays by Nasal route daily.       METFORMIN 500 MG Oral Tab TAKE 2 TABLETS(1000 MG) BY MOUTH TWICE DAILY WITH MEALS 360 tablet 0 7/23/2024 at 1700    tamsulosin (FLOMAX) cap Take 2 capsules (0.8 mg total) by mouth every evening. 180 capsule 3 7/23/2024 at 1700    Sildenafil Citrate 100 MG Oral Tab TAKE 1 TABLET BY MOUTH DAILY AS NEEDED FOR ERECTILE DYSFUNCTION 24 tablet 0     LOVASTATIN 20 MG Oral Tab TAKE 1  TABLET(20 MG) BY MOUTH EVERY NIGHT 90 tablet 0 7/23/2024 at `700    hydrochlorothiazide 25 MG Oral Tab Take 1 tablet (25 mg total) by mouth once daily. 90 tablet 3 7/23/2024 at 0800    amLODIPine Besylate 10 MG Oral Tab Take 1 tablet (10 mg total) by mouth daily. 90 tablet 3 7/23/2024 at 0800    losartan 100 MG Oral Tab Take 1 tablet (100 mg total) by mouth daily. 90 tablet 3 7/23/2024 at 1700    Labetalol HCl 200 MG Oral Tab Take 1 tablet (200 mg total) by mouth 2 (two) times daily. 180 tablet 3 7/23/2024 at 1700    Blood Glucose Monitoring Suppl (ACCU-CHEK JOAN SMARTVIEW) w/Device Does not apply Kit Used as directed. 1 kit 0     Glucose Blood In Vitro Strip Test twice daily three times weekly 50 strip 5     ACCU-CHEK FASTCLIX LANCETS Does not apply Misc Pt has accu-chek joan meter.Tests twice daily 200 each 3     Multiple Vitamins-Minerals (MULTI-VITAMIN/MINERALS) Oral Tab Take 1 tablet by mouth daily.   Past Month       Allergies: Ace inhibitors      Anesthesia Evaluation    Patient summary reviewed.    Anesthetic Complications  (-) history of anesthetic complications         GI/Hepatic/Renal                                 Cardiovascular      ECG reviewed.          (+) obesity  (+) hypertension     (+) CAD                                Endo/Other      (+) diabetes  type 2,                          Pulmonary                    (+) sleep apnea       Neuro/Psych                                      Past Surgical History:   Procedure Laterality Date    Colonoscopy  2008    due 2013    Colonoscopy  06/27/2020    Dr Epps: adenoma, tics, int hemorrhoids - 5 year recall    Colonoscopy,biopsy  11/08/2014    Procedure: ;  Surgeon: Micky Banegas MD;  [place] [facility] [place]    Colonoscopy,remv lesdinesh,snare N/A 11/08/2014    TEREZA Banegas. 5 adenomas, (2 large), diverticuli, hemorrhoids, repeat 2017 w/split 4L prep.     Other surgical history  1997    Left knee arthroscopy    Other surgical history  1998    nasal or  sinus surgery, not certain    Other surgical history  08/14/2017    cysto w/ Dr. Montalvo    Total hip replacement Right      Social History     Socioeconomic History    Marital status: Single   Tobacco Use    Smoking status: Never    Smokeless tobacco: Current     Types: Snuff, Chew    Tobacco comments:     chews tobacco   Vaping Use    Vaping status: Never Used   Substance and Sexual Activity    Alcohol use: Yes     Comment: socially on the weekend    Drug use: No    Sexual activity: Yes     Partners: Female   Other Topics Concern    Exercise Yes    Seat Belt Yes     History   Drug Use No     Available pre-op labs reviewed.  Lab Results   Component Value Date    WBC 7.3 07/12/2024    RBC 4.48 07/12/2024    HGB 13.6 07/12/2024    HCT 41.0 07/12/2024    MCV 91.5 07/12/2024    MCH 30.4 07/12/2024    MCHC 33.2 07/12/2024    RDW 13.0 07/12/2024    .0 07/12/2024     Lab Results   Component Value Date     07/12/2024    K 4.7 07/12/2024     07/12/2024    CO2 28.0 07/12/2024    BUN 12 07/12/2024    CREATSERUM 0.97 07/12/2024     (H) 07/12/2024    CA 9.8 07/12/2024            Airway      Mallampati: II  Mouth opening: >3 FB  TM distance: 4 - 6 cm  Neck ROM: full Cardiovascular    Cardiovascular exam normal.         Dental      Dental appliance(s): upper dentures and lower dentures       Pulmonary    Pulmonary exam normal.                 Other findings              ASA: 3   Plan: general  NPO status verified and patient meets guidelines.    Post-procedure pain management plan discussed with surgeon and patient.  Surgeon requests: regional block  Comment: Options, risks and benefits of anesthesia as outlined in the anesthesia consent were reviewed with the patient. Risks and benefits of GA including sore throat, allergy, nausea, vomiting, dental trauma, pain management modalities were all discussed. Particularly the risk of dental trauma with weakened teeth or crowns, partials, fillings and any non  natural teeth due to instrumentation of oral cavity and airway. Patient understands risks and verbally agreed to proceed. All questions answered.The consent was signed without further questions.    Risk and benefits of nerve block explained including but not limited to: nerve damage, neuropathy, bleeding, infection.       Plan/risks discussed with: patient and spouse                Present on Admission:  **None**

## 2024-07-24 NOTE — H&P
Urology Pre OP H&P  Encounter Date: 7/24/2024    Chief Complaint:   Prostate Cancer     History of Present Illness:   Fernando Pacheco is a 69 year old male who presents today for evaluation of intermediate risk prostate adenocarcinoma.     PSA of 9.77 in March, 2024. Prostate MRI in April, 2024 showed a 57mL gland with a PI-RADS IV lesion.     Subsequent prostate biopsy with 1 core of GG1 prostate adenocarcinoma, and 9 cores of GG2 prostate adenocarcinoma.    He presents today to discuss treatment options for his unfavorable intermdiate risk prostate cancer.    He has mild/mod LUTS and is on tamsulosin 0.4mg nightly.     Erections are \"fine\" with sildenafil.     He denies a family history of prostate cancer.     He denies an abdominal surgical history. Takes ASA 81mg daily.     Past Medical History:   Diagnosis Date   Benign localized prostatic hyperplasia with lower urinary tract symptoms (LUTS)   High blood pressure   High cholesterol   Hypercholesterolemia   Kidney disease   Obesity, Class II, BMI 35-39.9 10/31/2019   Obesity, unspecified   Other and unspecified hyperlipidemia   OTHER DISEASES 1999   colon polyps   Type II or unspecified type diabetes mellitus without mention of complication, not stated as uncontrolled   Unspecified essential hypertension   Unspecified sleep apnea   not using CPAP   Visual impairment   glasses     Past Surgical History:   Procedure Laterality Date   COLONOSCOPY 2008   due 2013   COLONOSCOPY 06/27/2020   Dr Epps: adenoma, tics, int hemorrhoids - 5 year recall   COLONOSCOPY,BIOPSY 11/08/2014   Procedure: ; Surgeon: Micky Banegas MD; Location: [facility]   COLONOSCOPY,REMV LESN,SNARE N/A 11/08/2014   TEREZA Banegas. 5 adenomas, (2 large), diverticuli, hemorrhoids, repeat 2017 w/split 4L prep.   OTHER SURGICAL HISTORY 1997   Left knee arthroscopy   OTHER SURGICAL HISTORY 1998   nasal or sinus surgery, not certain   OTHER SURGICAL HISTORY 08/14/2017   cysto w/ Dr. Montalvo   OTHER  SURGICAL HISTORY 05/20/2024   PnBx - dr. De La aPz   SKIN SURGERY Left 02/19/2024   SCC in situ, Left antihelix, MMS with Dr. Cooper   SKIN SURGERY 02/19/2024   MMS SCCIS LEFT ANTIHELIX DR NASH     Allergies:  Ace Inhibitors  Current Outpatient Medications   Medication Sig Dispense Refill   labetalol 200 MG Oral Tab TAKE 1 TABLET BY MOUTH TWICE DAILY 180 tablet 2   Sildenafil Citrate 100 MG Oral Tab Take 1 tablet (100 mg total) by mouth as needed for Erectile Dysfunction. 24 tablet 3   amLODIPine 10 MG Oral Tab Take 1 tablet (10 mg total) by mouth daily. 90 tablet 3   fluticasone propionate 50 MCG/ACT Nasal Suspension 2 sprays by Nasal route daily. 3 each 3   hydroCHLOROthiazide 25 MG Oral Tab Take 1 tablet (25 mg total) by mouth once daily. 90 tablet 3   losartan 100 MG Oral Tab Take 1 tablet (100 mg total) by mouth daily. 100 tablet 3   Lovastatin 40 MG Oral Tab Take 1 tablet (40 mg total) by mouth daily. 90 tablet 3   metFORMIN 500 MG Oral Tab TAKE 2 TABLETS(1000 MG TOTAL) BY MOUTH TWICE DAILY WITH MEALS 360 tablet 3   tamsulosin (Flomax) cap Take 2 capsules (0.8 mg total) by mouth every evening. 180 capsule 3   Doxycycline Hyclate 100 MG Oral Tab Take 1 tablet (100 mg total) by mouth 2 (two) times daily. with food 14 tablet 0   gentamicin 0.1 % External Ointment Apply topically to area twice a day, mixed with vaseline 30 g 0   HYDROcodone-acetaminophen (NORCO) 5-325 MG Oral Tab Take 1 tablet by mouth every 4 to 6 hours as needed for Pain. 8 tablet 0   mometasone 0.1 % External Ointment Apply to ears BID PRN for scaling and itching 45 g 2   aspirin EC 81 MG Oral Tab EC Take 1 tablet (81 mg total) by mouth daily. 0   Blood Glucose Monitoring Suppl (ACCU-CHEK CONNIE SMARTVIEW) w/Device Does not apply Kit Used as directed. 1 kit 0   Glucose Blood In Vitro Strip Test twice daily three times weekly 50 strip 5   ACCU-CHEK FASTCLIX LANCETS Does not apply Misc Pt has accu-chek connie meter.Tests twice daily 200 each 3   Multiple  Vitamins-Minerals (MULTI-VITAMIN/MINERALS) Oral Tab Take 1 tablet by mouth daily.     Social History:  He reports that he has never smoked. His smokeless tobacco use includes snuff and chew. He reports current alcohol use. He reports that he does not use drugs.    Family History   Problem Relation Age of Onset   Hypertension Father   Other (Other) Mother   dementia   Hypertension Brother     Review of Systems:   General: Denies anorexia, weight loss, fevers, chills, night sweats, or other constitutional symptoms.   Neurologic: Denies headaches, stiff neck, photophobia, numbness, or weakness of extremities.   Psychiatric: Denies anxiety, depression.  Eyes: Denies vision changes.  Skin: Denies skin changes or rash.  Cardiac: Denies chest pain, palpitations, or orthopnea.  Pulmonary: Denies cough, hemoptysis, shortness of breath, or dyspnea on exertion.  GI: Denies abdominal pain, nausea, vomiting, or changes in bowel movements.  Musculoskeletal: Denies extremity pain, weakness.  : See HPI.    Physical Examination:   General: Awake, Alert, Oriented. Well-nourished. Appears stated age.  Neurologic: No focal deficits. Normal gait.  HEENT: EOMI. No scleral icterus.  Cardiac: Regular rate and rhythm.  Respiratory: Non-labored respirations.  Abdomen: Soft, Non-tender, non-distended. No palpable masses. No costovertebral angle tenderness.  Extremities: Warm, well-perfused. No palpable edema.  Skin: Normal-appearing. No rash or lesions.  Genitourinary: Deferred    Laboratory Review:   Component  Latest Ref Rng 7/26/2023 3/20/2024   PSA  0.01 - 4 ng/mL 7.58 (H) 9.77 (H)     Radiology Review:   I have personally reviewed all pertinent imaging.    MRI Prostate 4/21/2024:  1. Index peripheral zone lesion within the left posteromedial prostate [id number].   2. Background of prostatitis and benign prostatic hyperplasia.     Pathology Review:   Prostate Biopsy 5/20/2024:  A. Prostate, right base, biopsy:  -Adenocarcinoma of  the prostate, grade group 2 (Marne score 3 + 4 = 7).  -Tumor in 1 of 2 cores, 1 mm involving 5% of submitted tissue.     B. Prostate, right mid, biopsy:  -Adenocarcinoma of the prostate, grade group 1 (Korin score 3 + 3 = 6).  -Tumor in 1 of 2 cores, 3 mm involving 10% of submitted tissue.     C. Prostate, right apex, biopsy:  -Benign prostatic tissue.    D. Prostate, left base, biopsy:  -Adenocarcinoma of the prostate, grade group 2 (Korin score 3 + 4 = 7).  -Tumor in 2 of 2 cores, 18 mm involving 80% of submitted tissue.     E. Prostate, left mid, biopsy:  -Adenocarcinoma of the prostate, grade group 2 (Marne score 3 + 4 = 7).  -Tumor in 2 of 2 cores, 16 mm involving 70% of submitted tissue.     F. Prostate, left apex, biopsy:  -Adenocarcinoma of the prostate, grade group 2 (Marne score 3 + 4 = 7).  -Tumor in 1 of 2 cores, 4 mm involving 20% of submitted tissue.   -Perineural invasion is present.    G. Prostate, target region, biopsy:   -Adenocarcinoma of the prostate, grade group 2 (Korin score 3 + 4 = 7).  -Tumor in 3 of 3 cores, 12 mm involving 30% of submitted tissue.     Assessment:   In summary, Fernando Pacheco is a 69 year old male with unfavorable intermediate risk prostate adenocarcinoma.    Plan:   After completing a physical examination and reviewing the patient's history and laboratory and pathology findings presented today, we discussed with the patient his diagnosis of prostate cancer. We reviewed the clinical staging system for prostate cancer and the prognostic significance of his biopsy pathology findings (Korin score), clinical stage, and serum PSA in predicting both pathologic stage found at surgery as well as the long-term prognosis following surgical or radiation therapy. We discussed how these data can be used to stratify patients as low-risk, intermediate-risk and high-risk for post-therapy failure and can direct choice of treatment appropriate to their risk assessment.    We  reviewed the primary modes of treatment for prostate cancer: active surveillance, radiation therapy (XRT), radical prostatectomy, cryotherapy, high intensity focused ultrasound (HIFU) and hormonal therapy.    Regarding surgery, I explained the primary operation that I primarily perform, robotic-assisted laparoscopic radical prostatectomy, and compared it with the alternative that I am also experienced with traditional, open radical retropubic prostatectomy (RRP). I also mentioned the less commonly used, but accepted perineal approach. The advantages and limitations of these various approaches were described in detail. The anticipated hospital and post-operative courses were reviewed. I highlighted the relevant anatomy, and we discussed the importance of neurovascular bundle preservation (nerve sparing) to minimize negative effects of surgery on erectile function. I explained that concurrent pelvic lymphadenectomy is routinely performed, and serves both a diagnostic and potentially therapeutic purpose if gio disease is identified. While in the majority of cases bilateral nerve sparing is appropriate and possible, he understands that in certain circumstances, intentional partial or complete unilateral or bilateral neurovascular bundle resection is necessary when there is suspicion that cancer extends into that region. This may be determined either preoperatively or intraoperatively. I discussed the general risks of surgery, which include, but are not limited to bleeding (potentially requiring a blood transfusion), infection, urinary fistula, vesicourethral anastomotic contracture, medical and anesthetic complications, or adjacent organ involvement or injury (ureters, bladder, pelvic vessels, rectum). We explained that the risk of rectal injury is <1% and that in most of cases the injury can be repaired primarily; however, a temporary colostomy is at times necessary. We discussed the common specific risks of  prostatectomy, which include stress urinary incontinence- (commonly mild but which can also be severe in rare circumstances), and erectile dysfunction--which can occur despite bilateral nerve-sparing. The patient understands that the majority of men will experience a decrease in the quality of their erections post-operatively, though the degree and duration of which vary depending on a range of patient-specific and surgical factors. Poor pre-operative erectile function, patient age, and the degree of nerve sparing are prognostic factors for the degree of post-operative erectile function.    I have discussed the risks, benefits and alternatives to the proposed procedure/treatment plan with the patient.  Our discussion also included the risks and benefits of the alternatives treatment options, including doing nothing. They were encouraged to ask questions and all questions were answered to their satisfaction.  At the end of our discussion they gave their verbal consent to the proposed procedure/treatment plan.    Scott De La Paz MD  [facility]  Department of Urology  Office: [phone]

## 2024-07-25 PROBLEM — Z90.79 S/P PROSTATECTOMY: Status: ACTIVE | Noted: 2024-07-25

## 2024-07-25 LAB
ANION GAP SERPL CALC-SCNC: 4 MMOL/L (ref 0–18)
BUN BLD-MCNC: 9 MG/DL (ref 9–23)
CALCIUM BLD-MCNC: 8.6 MG/DL (ref 8.7–10.4)
CHLORIDE SERPL-SCNC: 107 MMOL/L (ref 98–112)
CO2 SERPL-SCNC: 29 MMOL/L (ref 21–32)
CREAT BLD-MCNC: 0.98 MG/DL
CREAT FLD-MCNC: 0.8 MG/DL
EGFRCR SERPLBLD CKD-EPI 2021: 83 ML/MIN/1.73M2 (ref 60–?)
ERYTHROCYTE [DISTWIDTH] IN BLOOD BY AUTOMATED COUNT: 14 %
GLUCOSE BLD-MCNC: 125 MG/DL (ref 70–99)
GLUCOSE BLD-MCNC: 146 MG/DL (ref 70–99)
GLUCOSE BLD-MCNC: 156 MG/DL (ref 70–99)
GLUCOSE BLD-MCNC: 173 MG/DL (ref 70–99)
GLUCOSE BLD-MCNC: 210 MG/DL (ref 70–99)
HCT VFR BLD AUTO: 35.7 %
HGB BLD-MCNC: 11.7 G/DL
MAGNESIUM SERPL-MCNC: 1.9 MG/DL (ref 1.6–2.6)
MCH RBC QN AUTO: 30.7 PG (ref 26–34)
MCHC RBC AUTO-ENTMCNC: 32.8 G/DL (ref 31–37)
MCV RBC AUTO: 93.7 FL
OSMOLALITY SERPL CALC.SUM OF ELEC: 292 MOSM/KG (ref 275–295)
PLATELET # BLD AUTO: 215 10(3)UL (ref 150–450)
POTASSIUM SERPL-SCNC: 4.6 MMOL/L (ref 3.5–5.1)
RBC # BLD AUTO: 3.81 X10(6)UL
SODIUM SERPL-SCNC: 140 MMOL/L (ref 136–145)
WBC # BLD AUTO: 9.9 X10(3) UL (ref 4–11)

## 2024-07-25 PROCEDURE — 82570 ASSAY OF URINE CREATININE: CPT | Performed by: PHYSICIAN ASSISTANT

## 2024-07-25 PROCEDURE — 82962 GLUCOSE BLOOD TEST: CPT

## 2024-07-25 PROCEDURE — 85027 COMPLETE CBC AUTOMATED: CPT | Performed by: UROLOGY

## 2024-07-25 PROCEDURE — 83735 ASSAY OF MAGNESIUM: CPT | Performed by: UROLOGY

## 2024-07-25 PROCEDURE — 80048 BASIC METABOLIC PNL TOTAL CA: CPT | Performed by: UROLOGY

## 2024-07-25 NOTE — DISCHARGE INSTRUCTIONS
Home Care Instructions  Robotic Prostatectomy      Pain Control  You will be provided with a prescription for tramadol.  You may take 1 tablet every 12 hours as needed for pain.  This is a narcotic medication.  You should take this medication with food.  Do not drive or operate machinery while taking this medication.  This medication can cause constipation so you should be taking a stool softener to avoid constipation.     Diet  Diet as tolerated.  You may want to avoid greasy fatty and spicy foods for a couple days.  You will probably feel bloated and gassy for the next couple days and these types of foods will likely add to that.  Drink plenty of fluids.      Wound Care  You may shower.  Let the water run over your incisions, do not scrub at them, and pat dry after your shower.  No tub baths or pool water.  You had a emilee dobbs drain  (MARTHA Drain) which was removed from your abdomen and you may experience some drainage from this incision.  You should apply a gauze over this site to catch the drainage and change as needed.      Activity  No lifting greater than 10 lbs (a gallon of milk is 8 lbs).  No strenuous activity or exercise.  This involves vacuum cleaning, mowing the lawn, and scrubbing floors.  You may climb stairs as needed but do not continuously run up and down the stairs.  When you do need to go up or down, take them slow.  You should be up and walking around.  Do not sit for extended periods of time.  You may ride and be a passenger in a car, however you may not drive while you have a zepeda catheter.      Notify your physician if:  You develop a temperature greater than 101 or persistently greater than 100  Persistent nausea and/or vomiting, persistent diarrhea  Increased pain not relieved by tramadol  Foul smelling drainage and/or redness to your incisions  If you have chest pain, calf pain/swelling, or shortness of breath please go to the emergency room    Below are your zepeda catheter  instructions.  You will follow up on 8/1/24 for your zepeda removal; start bactrim 1 day prior to zepeda catheter removal.    Follow-up with Dr. De La Paz on 9/4/24          Home Care Instructions For Zepeda Catheter Care (Office Removal)     We hope you were pleased with your care at [facility].  We wish you the best outcome and overall experience with your operation.  These instructions will help to minimize pain, prevent an infection, and improve the likelihood of a successful result.    CARE OF YOUR URINE CATHETER ( ALSO CALLED A ZEPEDA CATHETER)  You have been discharged with an indwelling urinary catheter ( Ezpeda catheter). A catheter is a thin, flexible tube. An indwelling urinary catheter has two parts. The first part is a tube that drains urine from your bladder. The second part is a bag or other device that collects the urine.   The most important thing to remember is that you want to prevent infection. Always wash your hands before handling your catheter bag or tubing.     Home Care Basics:  Empty the urine bag when it is full  Do not pull on the catheter or remove it.  Removal by you can damage your urethra.    Draining The Bedside Bag   Wash your hands with soap and clean, running water or an alcohol-based hand  that contains at least 60% alcohol.   Hold the drainage tube over a toilet or measuring container.   Unclamp the tube and let the bag drain.   Don’t touch the tip of the drainage tube or let it touch the toilet or container, if it does please scrub the tip with alcohol.    Emptying a Leg Bag   Wash your hands.   Remove the stopper on the bag.   Drain the bag into the toilet or a measuring container. Don’t let the tip of the drainage tube touch anything, including your fingers.   Clean the tip of the drainage tube with alcohol.   Replace the stopper.    Cleaning The Drainage Tube   When the bag is empty, clean the tip of the drainage tube with an alcohol wipe.   Clamp the tube.   Reinsert  the tube into the pocket on the drainage bag.    Cleaning Your Skin and Tubing   Clean the skin near the catheter with soap and water Twice Daily, or more if soiled.   Wash your genital area.   Wash the catheter tubing. Always wash the catheter in the direction away from your body.   You will be told when and how to change your bag and tubing.   Don’t try to remove the catheter by yourself.   You may shower with the catheter in place.  Always keep the catheter off of the floor, even when showering            It has been a pleasure taking care of you!  Best wishes for a speedy recovery!  Danisha SAMPSON

## 2024-07-25 NOTE — PLAN OF CARE
Alert and oriented x4. 2L O2 via NC overnight.  Pain managed with PRN Oxycodone 5mg with relief. IV fluids infusing per order. Austin catheter intact, clear yellow with some sediment, otherwise patent and without complications. Lovenox prophy, SCDs in place. Abdominal incisions CDI. LLQ MARTHA CDI without complication. NSR on telemetry. Tolerating clear liquids.     Patient denies chest pain, dyspnea, lightheadedness.     0100  Endorses stinging, spasming urethral pain, Ditropan and Bacitracin administered. Abdominal soreness rated 5/10. Denies belching, denies gas.     0530  MARTHA Creatinine sent     0653  8 beats Vtach per telemetry, denies lightheadedness, normotensive, tolerating room air.   No

## 2024-07-25 NOTE — PROGRESS NOTES
[facility]  Urology Progress Note    Fernando Pacheco Patient Status:  Outpatient in a Bed    1955 MRN [id number]   Location [facility] 3NW-A Attending Scott De La Paz MD   Hosp Day # 0 PCP Andrey Nguyen MD     Subjective:  Fernando Pacheco is a(n) 69 year old male.    S/P robotic-assisted laparoscopic radical prostatectomy, bilateral pelvic lymphadenectomy, anterior urethropexy 24 with Dr. De La Paz.    Current complaints: +abdominal pain, discomfort from zepeda catheter.  No nausea, vomiting, fever, or chills.  No CP, SOB, or calf pain.      Objective:  General appearance: alert, appears stated age, and cooperative  Blood pressure 149/76, pulse 83, temperature 99 °F (37.2 °C), temperature source Axillary, resp. rate 16, height 5' 10\" (1.778 m), weight 255 lb (115.7 kg), SpO2 97%.  Lungs: non-labored respirations  Abdomen: soft, mildly distended with expected incisional tenderness.  Incisions C/D/I.  MARTHA drain with SS fluid (output - 60 mL total yesterday)  : zepeda catheter in place draining yellow urine (UOP - 1850 mL total yesterday).  Lab Results   Component Value Date    WBC 11.2 2024    HGB 12.7 2024    HCT 39.1 2024    .0 2024    CREATSERUM 1.14 2024    BUN 12 2024     2024    K 4.4 2024     2024    CO2 25.0 2024     2024    CA 8.8 2024    ALB 4.2 2024    ALKPHO 73 2024    BILT 0.6 2024    TP 6.5 2024    AST 20 2024    ALT 22 2024    PTT 28.3 2024    INR 1.00 2024    PTP 13.2 2024    PGLU 175 2024        Assessment:    PROSTATE CANCER  S/P robotic-assisted laparoscopic radical prostatectomy, bilateral pelvic lymphadenectomy, anterior urethropexy 24 with Dr. De La Paz.  Afebrile, VSS  WBC 11.2  Hgb 12.7  Serum creat 1.14  Repeat labs to be collected  Pathology pending    Plan:    Encouraged ambulation and use of IS x 10/hr  Advance diet as  tolerated  Pain management  Bowel regimen  Follow labs, UOP, drain output  MARTHA drain creat  CPM with zepeda catheter  Hospitalist following    Note: patient has scripts for tramadol, colace, bactrim from pre-op visit.    Dr. De La Paz to see patient later today    Patient to follow-up on 8/1/24 for zepeda catheter removal and on 9/4/24 for post-op visit.      Discharge pending clinical course.      Above discussed with patient, nurse    Bianca Dyer PA-C  [facility]  Department of Urology  7/25/2024  5:16 AM    Addendum:  Recent Labs   Lab 07/24/24  1312 07/25/24  0546   RBC 4.19 3.81   HGB 12.7* 11.7*   HCT 39.1 35.7*   MCV 93.3 93.7   MCH 30.3 30.7   MCHC 32.5 32.8   RDW 13.6 14.0   WBC 11.2* 9.9   .0 215.0       Recent Labs   Lab 07/24/24  1312 07/25/24  0547   * 156*   BUN 12 9   CREATSERUM 1.14 0.98   EGFRCR 70 83   CA 8.8 8.6*   * 140   K 4.4 4.6    107   CO2 25.0 29.0     Drain creat 0.8    Above discussed with Dr. De La Paz - drain can be removed.      Bianca Dyer P.A.-C  Urology

## 2024-07-25 NOTE — PLAN OF CARE
Pt A&Ox4, resting in bed.  Resp: RA,  (2L NOC)  Cardiac: NSR  GI/: Austin in place (to remain in place at home)  Activity/Safety: up w/walker  Skin: x4 abd lap sites w/glue, TYREE  Pain: oxy given  Pt updated on and agreeable to POC; IV fluids, pain control

## 2024-07-25 NOTE — PROGRESS NOTES
[facility] Hospitalist Progress Note                                                                     [facility]   part of [facility]      Fernando Pacheco  2/2/1955    SUBJECTIVE: Patient denies any chest pain, palpitations, shortness of breath, cough, nausea, vomiting.  Patient still is having abdominal pain.  Patient denies any significant flatus or bowel movements.  Patient will need clear liquid diet at this time.    OBJECTIVE:  Temp:  [97 °F (36.1 °C)-98.6 °F (37 °C)] 98.6 °F (37 °C)  Pulse:  [61-88] 77  Resp:  [11-19] 16  BP: (128-148)/(67-89) 128/69  SpO2:  [89 %-99 %] 98 %  Exam  Gen: No acute distress, alert and oriented   Pulm: Lungs clear bilaterally, normal respiratory effort, no crackles, no wheezing  CV: Heart with regular rate and rhythm, no murmur.   Abd: Abdomen soft, expected generalized abdominal tenderness, nondistended, bowel sounds present  MSK: no significant pitting edema or tenderness of the LE  Skin: no new rashes or lesions    Labs:   Recent Labs   Lab 07/24/24  0713 07/24/24  1312 07/25/24  0546   WBC  --  11.2* 9.9   HGB  --  12.7* 11.7*   MCV  --  93.3 93.7   PLT  --  229.0 215.0   INR 1.00  --   --        Recent Labs   Lab 07/24/24  1312 07/25/24  0547   * 140   K 4.4 4.6    107   CO2 25.0 29.0   BUN 12 9   CREATSERUM 1.14 0.98   CA 8.8 8.6*   MG  --  1.9   * 156*       Recent Labs   Lab 07/24/24  0713   ALT 22   AST 20   ALB 4.2       Recent Labs   Lab 07/24/24  0621 07/24/24  1251 07/24/24  1443 07/24/24  2120   PGLU 163* 193* 196* 175*       Meds:   Scheduled:    amLODIPine  10 mg Oral Daily    labetalol  200 mg Oral BID    losartan  100 mg Oral Daily    atorvastatin  10 mg Oral Nightly    [START ON 7/25/2024] enoxaparin  40 mg Subcutaneous Daily    sennosides  17.2 mg Oral Nightly    docusate sodium  100 mg Oral BID    insulin aspart  1-10 Units Subcutaneous TID AC and HS     Continuous Infusions:    sodium  chloride 125 mL/hr at 07/24/24 2150     PRN:   polyethylene glycol (PEG 3350)    ondansetron    metoclopramide    oxyCODONE **OR** oxyCODONE    HYDROmorphone **OR** HYDROmorphone    melatonin    bacitracin    oxybutynin    benzocaine-menthol    Assessment/Plan:      Fernando Pacheco is a(n) 69 year old male w/ PMHx of eHTN, HLD, STEVEN, Obesity BMI 36, DM2, ascending aortic aneurysm, mildly elevated coronary calcium score, Intermediate risk prostate cancer who presented for surgery. S/p Robotic assisted laparoscopic radical prostatectomy, bilateral pelvic lymphadenectomy, anterior urethropexy with Dr. De La Paz.      Intermediate risk prostate cancer    S/p Robotic assisted laparoscopic radical prostatectomy, bilateral pelvic lymphadenectomy, anterior urethropexy with Dr. De La Paz 07/24/24   H/o bph  Post op pain   - mgmt per urology  - zepeda mgmt per uro - plan to have decath trial in clinic   - check post op labs   - cont flomax ??  - oxy po prn  - iv dilaudid prn      DM2  - hold home agents  - ssi      eHTN  HLD  - norvasc 10mg/d w/ hold parameters   - hydrochlorothiazide 25mg/d - hold - resume once off iv fluids  - labetalol 200mg bid w/ hold parameters   - losartan 100mg daily w/ hold parameters   - atorvastatin in place of lovastatin      DVT ppx: lovenox   Code Status: full     Dispo: per urology     Gagan Simmons Hospitalist  [phone]

## 2024-07-26 VITALS
RESPIRATION RATE: 18 BRPM | WEIGHT: 255 LBS | HEIGHT: 70 IN | BODY MASS INDEX: 36.51 KG/M2 | HEART RATE: 77 BPM | SYSTOLIC BLOOD PRESSURE: 125 MMHG | OXYGEN SATURATION: 93 % | TEMPERATURE: 100 F | DIASTOLIC BLOOD PRESSURE: 60 MMHG

## 2024-07-26 LAB
ANION GAP SERPL CALC-SCNC: 5 MMOL/L (ref 0–18)
BUN BLD-MCNC: 7 MG/DL (ref 9–23)
CALCIUM BLD-MCNC: 8.7 MG/DL (ref 8.7–10.4)
CHLORIDE SERPL-SCNC: 105 MMOL/L (ref 98–112)
CO2 SERPL-SCNC: 27 MMOL/L (ref 21–32)
CREAT BLD-MCNC: 0.79 MG/DL
EGFRCR SERPLBLD CKD-EPI 2021: 96 ML/MIN/1.73M2 (ref 60–?)
ERYTHROCYTE [DISTWIDTH] IN BLOOD BY AUTOMATED COUNT: 14.2 %
GLUCOSE BLD-MCNC: 153 MG/DL (ref 70–99)
GLUCOSE BLD-MCNC: 166 MG/DL (ref 70–99)
GLUCOSE BLD-MCNC: 170 MG/DL (ref 70–99)
HCT VFR BLD AUTO: 35.8 %
HGB BLD-MCNC: 11.6 G/DL
MAGNESIUM SERPL-MCNC: 2.1 MG/DL (ref 1.6–2.6)
MCH RBC QN AUTO: 30.2 PG (ref 26–34)
MCHC RBC AUTO-ENTMCNC: 32.4 G/DL (ref 31–37)
MCV RBC AUTO: 93.2 FL
OSMOLALITY SERPL CALC.SUM OF ELEC: 285 MOSM/KG (ref 275–295)
PLATELET # BLD AUTO: 215 10(3)UL (ref 150–450)
POTASSIUM SERPL-SCNC: 3.9 MMOL/L (ref 3.5–5.1)
RBC # BLD AUTO: 3.84 X10(6)UL
SODIUM SERPL-SCNC: 137 MMOL/L (ref 136–145)
WBC # BLD AUTO: 10.9 X10(3) UL (ref 4–11)

## 2024-07-26 PROCEDURE — 80048 BASIC METABOLIC PNL TOTAL CA: CPT | Performed by: UROLOGY

## 2024-07-26 PROCEDURE — 83735 ASSAY OF MAGNESIUM: CPT | Performed by: UROLOGY

## 2024-07-26 PROCEDURE — 82962 GLUCOSE BLOOD TEST: CPT

## 2024-07-26 PROCEDURE — 85027 COMPLETE CBC AUTOMATED: CPT | Performed by: UROLOGY

## 2024-07-26 NOTE — PROGRESS NOTES
[facility] Hospitalist Progress Note                                                                     [facility]   part of [facility]      Fernando Pacheco  2/2/1955    SUBJECTIVE: Patient denies any chest pain, palpitations, shortness of breath, cough, nausea, vomiting.  Patient still is having abdominal pain but improved. Patient tolerating diet    OBJECTIVE:  Temp:  [98 °F (36.7 °C)-99.6 °F (37.6 °C)] 99.6 °F (37.6 °C)  Pulse:  [69-85] 77  Resp:  [18-19] 18  BP: (125-158)/(60-82) 125/60  SpO2:  [92 %-98 %] 93 %  Exam  Gen: No acute distress, alert and oriented   Pulm: Lungs clear bilaterally, normal respiratory effort, no crackles, no wheezing  CV: Heart with regular rate and rhythm, no murmur.   Abd: Abdomen soft, expected generalized abdominal tenderness, nondistended, bowel sounds present  MSK: no significant pitting edema or tenderness of the LE  Skin: no new rashes or lesions    Labs:   Recent Labs   Lab 07/24/24  0713 07/24/24  1312 07/25/24  0546 07/26/24  0558   WBC  --  11.2* 9.9 10.9   HGB  --  12.7* 11.7* 11.6*   MCV  --  93.3 93.7 93.2   PLT  --  229.0 215.0 215.0   INR 1.00  --   --   --        Recent Labs   Lab 07/24/24  1312 07/25/24  0547 07/26/24  0558   * 140 137   K 4.4 4.6 3.9    107 105   CO2 25.0 29.0 27.0   BUN 12 9 7*   CREATSERUM 1.14 0.98 0.79   CA 8.8 8.6* 8.7   MG  --  1.9 2.1   * 156* 153*       Recent Labs   Lab 07/24/24  0713   ALT 22   AST 20   ALB 4.2       Recent Labs   Lab 07/25/24  1121 07/25/24  1736 07/25/24  2109 07/26/24  0607 07/26/24  1129   PGLU 173* 125* 210* 166* 170*       Meds:   Scheduled:    amLODIPine  10 mg Oral Daily    labetalol  200 mg Oral BID    losartan  100 mg Oral Daily    atorvastatin  10 mg Oral Nightly    enoxaparin  40 mg Subcutaneous Daily    sennosides  17.2 mg Oral Nightly    docusate sodium  100 mg Oral BID    insulin aspart  1-10 Units Subcutaneous TID AC and HS     Continuous  Infusions:    sodium chloride Stopped (07/25/24 2120)     PRN:   polyethylene glycol (PEG 3350)    ondansetron    metoclopramide    oxyCODONE **OR** oxyCODONE    HYDROmorphone **OR** HYDROmorphone    melatonin    bacitracin    oxybutynin    benzocaine-menthol    Assessment/Plan:      Fernando Pacheco is a(n) 69 year old male w/ PMHx of eHTN, HLD, STEVEN, Obesity BMI 36, DM2, ascending aortic aneurysm, mildly elevated coronary calcium score, Intermediate risk prostate cancer who presented for surgery. S/p Robotic assisted laparoscopic radical prostatectomy, bilateral pelvic lymphadenectomy, anterior urethropexy with Dr. De La Paz.      Intermediate risk prostate cancer    S/p Robotic assisted laparoscopic radical prostatectomy, bilateral pelvic lymphadenectomy, anterior urethropexy with Dr. De La Paz 07/24/24   H/o bph  Post op pain   - mgmt per urology  - zepeda mgmt per uro - plan to have decath trial in clinic   - check post op labs   - cont flomax ??  - oxy po prn  - iv dilaudid prn      DM2  - hold home agents, resume on dc  - ssi as inpt     eHTN  HLD  - norvasc 10mg/d w/ hold parameters   - hydrochlorothiazide 25mg/d - hold - resume once off iv fluids  - labetalol 200mg bid w/ hold parameters   - losartan 100mg daily w/ hold parameters   - atorvastatin in place of lovastatin   -may resume all home meds on dc     DVT ppx: lovenox   Code Status: full     Dispo: medically stable for dc    Gagan Simmons Hospitalist  [phone]

## 2024-07-26 NOTE — PLAN OF CARE
Alert and oriented x4. 1L O2 via NC while sleeping.  Pain managed with PRN Oxycodone 2.5-5mg with relief. IV fluids infusing per order. Austin catheter intact, clear yellow with scant sediment, otherwise patent and without complications. Lovenox prophy, SCDs in place. Abdominal incisions CDI.   NSR on telemetry.  SBA ambulation, up in chair back to bed.    Patient denies chest pain, dyspnea, lightheadedness.     0445  + BM, small formed stool per patient. + Flatus

## 2024-07-26 NOTE — PROGRESS NOTES
[facility]  Urology Progress Note    Fernando Pacheco Patient Status:  Inpatient    1955 MRN [id number]   Location [facility] 3NW-A Attending Scott De La Paz MD   Hosp Day # 1 PCP Andrey Nguyen MD     Subjective:  Fernando Pacheco is a(n) 69 year old male.    S/P robotic-assisted laparoscopic radical prostatectomy, bilateral pelvic lymphadenectomy, anterior urethropexy 24 with Dr. De La Paz.     Current complaints: Pain improving. No nausea, vomiting, fever, or chills. Tolerating full liquids.  No nausea, vomiting, fever, or chills. +flatus.  Small BM overnight.  No CP, SOB, or calf pain. Ambulating.      MARTHA drain removed yesterday.      Objective:  General appearance: alert, appears stated age, and cooperative  Blood pressure 158/81, pulse 75, temperature 98.3 °F (36.8 °C), temperature source Oral, resp. rate 18, height 5' 10\" (1.778 m), weight 255 lb (115.7 kg), SpO2 96%.  Lungs: non-labored respirations  Abdomen: soft with expected incisional tenderness.  Incisions C/D/I.    : zepeda catheter in place draining faintly pink tinged urine (UOP - 2500 mL/24 hours)  Lab Results   Component Value Date    PGLU 210 2024        Assessment:    PROSTATE CANCER  S/P robotic-assisted laparoscopic radical prostatectomy, bilateral pelvic lymphadenectomy, anterior urethropexy 24 with Dr. De La Paz.  Afebrile, VSS  WBC 11.2 > 9.9  Hgb 12.7 > 11.7  Serum creat 1.14 > 0.98  Drain creat 24: 0.8 - drain removed  Repeat labs to be collected  Pathology pending    Plan:    Encouraged ambulation and use of IS x 10/hr  Advance diet as tolerated  Pain management  Bowel regimen  Follow labs, UOP, drain output  CPM with zepeda catheter  Hospitalist following    Dr. De La Paz to see patient later today.     Note: patient has scripts for tramadol, colace, bactrim from pre-op visit.     Patient to follow-up on 24 for zepeda catheter removal and on 24 for post-op visit.      Possible DC today pending clinical course and  approval from hospitalist.      Above discussed with patient, nurse  Will update Dr. De La Paz.    Bianca Dyer PA-C  [facility]  Department of Urology  7/26/2024  5:51 AM    Addendum:  Recent Labs   Lab 07/24/24  1312 07/25/24  0546 07/26/24  0558   RBC 4.19 3.81 3.84   HGB 12.7* 11.7* 11.6*   HCT 39.1 35.7* 35.8*   MCV 93.3 93.7 93.2   MCH 30.3 30.7 30.2   MCHC 32.5 32.8 32.4   RDW 13.6 14.0 14.2   WBC 11.2* 9.9 10.9   .0 215.0 215.0       Recent Labs   Lab 07/24/24  1312 07/25/24  0547 07/26/24  0558   * 156* 153*   BUN 12 9 7*   CREATSERUM 1.14 0.98 0.79   EGFRCR 70 83 96   CA 8.8 8.6* 8.7   * 140 137   K 4.4 4.6 3.9    107 105   CO2 25.0 29.0 27.0       Above discussed with Dr. De La Paz including labs.    MARVIN Quiñonez  Urology

## 2024-07-26 NOTE — PLAN OF CARE
Pt a&o x 4.  Pleasant & cooperative w/ care  Up to chair w/ asst x 1 & rolling walker.  Gait slow & steady.  Pt has good safety awareness  Tolerating soft diet.  Denies n&v  Oxy given @ 0850  Zepeda draining clear yellow urine  Encourage IS  Printed educational material from [employer] given to pt re: home w/ zepeda cath & home after prostatectomy  Plan of care:  anticipate home after seen by urologist

## 2024-07-26 NOTE — PLAN OF CARE
Pt discharged home.  Discharge instructions given to pt & pt's wife, including:  Home medications  Diet & hydration  Hygiene  Activity  Wound care  Hygiene  Follow up care  Pt & pt's wife verbalized understanding of all instructions  Left unit stable via w/c

## 2024-07-29 NOTE — PAYOR COMM NOTE
--------------  DISCHARGE REVIEW    Payor: [facility]  Subscriber #:  [id number]  Authorization Number: [id number]    Admit date: 7/25/24  Admit time:   2:36 PM  Discharge Date: 7/26/2024  3:23 PM     Admitting Physician: Scott De La Paz MD  Attending Physician:  No att. providers found  Primary Care Physician: Andrey Nguyen MD    REVIEWER COMMENTS    FOR FINAL REVIEW/APPROVAL OF ALL INPT DAYS

## 2024-07-29 NOTE — PAYOR COMM NOTE
--------------  DISCHARGE REVIEW    Secondary Payor: [facility]  Subscriber #:  [id number]  Secondary Payor Authorization Number: N/A      Admit date: 7/25/24  Admit time:   2:36 PM  Discharge Date: 7/26/2024  3:23 PM     Admitting Physician: Scott De La Paz MD  Attending Physician:  No att. providers found  Primary Care Physician: Andrey Nguyen MD        REVIEWER COMMENTS    FOR FINAL REVIEW/APPROVAL OF ALL INPT DAYS

## 2024-07-29 NOTE — PAYOR COMM NOTE
--------------  ADMISSION REVIEW     Payor: [facility]  Subscriber #:  [id number]  Authorization Number: [id number]    Admit date: 7/25/24  Admit time:  2:36 PM       REVIEW DOCUMENTATION:    Urology Pre OP H&P  Chief Complaint:   Prostate Cancer     History of Present Illness:   69 year old male who presents today for evaluation of intermediate risk prostate adenocarcinoma.     PSA of 9.77 in March, 2024. Prostate MRI in April, 2024 showed a 57mL gland with a PI-RADS IV lesion.     Subsequent prostate biopsy with 1 core of GG1 prostate adenocarcinoma, and 9 cores of GG2 prostate adenocarcinoma.    He presents today to discuss treatment options for his unfavorable intermdiate risk prostate cancer.  He has mild/mod LUTS and is on tamsulosin 0.4mg nightly.   Erections are \"fine\" with sildenafil.   He denies a family history of prostate cancer.   He denies an abdominal surgical history. Takes ASA 81mg daily.     Past Medical History:   Benign localized prostatic hyperplasia with lower urinary tract symptoms (LUTS)   High blood pressure   High cholesterol   Hypercholesterolemia   Kidney disease   Obesity, Class II, BMI 35-39.9 10/31/2019   Obesity, unspecified   Other and unspecified hyperlipidemia   OTHER DISEASES 1999   colon polyps   Type II or unspecified type diabetes mellitus without mention of complication, not stated as uncontrolled   Unspecified essential hypertension   Unspecified sleep apnea   not using CPAP   Visual impairment   glasses     Past Surgical History:   Procedure Laterality Date   COLONOSCOPY 2008   due 2013   COLONOSCOPY 06/27/2020   Dr Epps: adenoma, tics, int hemorrhoids - 5 year recall   COLONOSCOPY,BIOPSY 11/08/2014   Procedure: ; Surgeon: Micky Banegas MD; Location: [facility]   COLONOSCOPY,REMV LESN,SNARE N/A 11/08/2014   TEREZA Banegas. 5 adenomas, (2 large), diverticuli, hemorrhoids, repeat 2017 w/split 4L prep.   OTHER SURGICAL HISTORY 1997   Left knee arthroscopy   OTHER  SURGICAL HISTORY 1998   nasal or sinus surgery, not certain   OTHER SURGICAL HISTORY 08/14/2017   cysto w/ Dr. Montalvo   OTHER SURGICAL HISTORY 05/20/2024   PnBx - dr. De La Paz   SKIN SURGERY Left 02/19/2024   SCC in situ, Left antihelix, MMS with Dr. Cooper   SKIN SURGERY 02/19/2024   MMS SCCIS LEFT ANTIHELIX DR NASH     Allergies:  Ace Inhibitors    Review of Systems:   General: Denies anorexia, weight loss, fevers, chills, night sweats, or other constitutional symptoms.   Neurologic: Denies headaches, stiff neck, photophobia, numbness, or weakness of extremities.   Psychiatric: Denies anxiety, depression.  Eyes: Denies vision changes.  Skin: Denies skin changes or rash.  Cardiac: Denies chest pain, palpitations, or orthopnea.  Pulmonary: Denies cough, hemoptysis, shortness of breath, or dyspnea on exertion.  GI: Denies abdominal pain, nausea, vomiting, or changes in bowel movements.  Musculoskeletal: Denies extremity pain, weakness.  : See HPI.    Physical Examination:   General: Awake, Alert, Oriented. Well-nourished. Appears stated age.  Neurologic: No focal deficits. Normal gait.  HEENT: EOMI. No scleral icterus.  Cardiac: Regular rate and rhythm.  Respiratory: Non-labored respirations.  Abdomen: Soft, Non-tender, non-distended. No palpable masses. No costovertebral angle tenderness.  Extremities: Warm, well-perfused. No palpable edema.  Skin: Normal-appearing. No rash or lesions.  Genitourinary: Deferred    Laboratory Review:   Component  Latest Ref Rng 7/26/2023 3/20/2024   PSA  0.01 - 4 ng/mL 7.58 (H) 9.77 (H)     Radiology Review:   I have personally reviewed all pertinent imaging.    MRI Prostate 4/21/2024:  1. Index peripheral zone lesion within the left posteromedial prostate [id number].   2. Background of prostatitis and benign prostatic hyperplasia.     Pathology Review:   Prostate Biopsy 5/20/2024:  A. Prostate, right base, biopsy:  -Adenocarcinoma of the prostate, grade group 2 (Calipatria score 3 +  4 = 7).  -Tumor in 1 of 2 cores, 1 mm involving 5% of submitted tissue.     B. Prostate, right mid, biopsy:  -Adenocarcinoma of the prostate, grade group 1 (Hitchcock score 3 + 3 = 6).  -Tumor in 1 of 2 cores, 3 mm involving 10% of submitted tissue.     C. Prostate, right apex, biopsy:  -Benign prostatic tissue.    D. Prostate, left base, biopsy:  -Adenocarcinoma of the prostate, grade group 2 (Korin score 3 + 4 = 7).  -Tumor in 2 of 2 cores, 18 mm involving 80% of submitted tissue.     E. Prostate, left mid, biopsy:  -Adenocarcinoma of the prostate, grade group 2 (Hitchcock score 3 + 4 = 7).  -Tumor in 2 of 2 cores, 16 mm involving 70% of submitted tissue.     F. Prostate, left apex, biopsy:  -Adenocarcinoma of the prostate, grade group 2 (Hitchcock score 3 + 4 = 7).  -Tumor in 1 of 2 cores, 4 mm involving 20% of submitted tissue.   -Perineural invasion is present.    G. Prostate, target region, biopsy:   -Adenocarcinoma of the prostate, grade group 2 (Korin score 3 + 4 = 7).  -Tumor in 3 of 3 cores, 12 mm involving 30% of submitted tissue.     Assessment:   In summary, 69 year old male with unfavorable intermediate risk prostate adenocarcinoma.  Plan:   After completing a physical examination and reviewing the patient's history and laboratory and pathology findings presented today, we discussed with the patient his diagnosis of prostate cancer. We reviewed the clinical staging system for prostate cancer and the prognostic significance of his biopsy pathology findings (Korin score), clinical stage, and serum PSA in predicting both pathologic stage found at surgery as well as the long-term prognosis following surgical or radiation therapy. We discussed how these data can be used to stratify patients as low-risk, intermediate-risk and high-risk for post-therapy failure and can direct choice of treatment appropriate to their risk assessment.    We reviewed the primary modes of treatment for prostate cancer: active  surveillance, radiation therapy (XRT), radical prostatectomy, cryotherapy, high intensity focused ultrasound (HIFU) and hormonal therapy.    Regarding surgery, I explained the primary operation that I primarily perform, robotic-assisted laparoscopic radical prostatectomy, and compared it with the alternative that I am also experienced with traditional, open radical retropubic prostatectomy (RRP). I also mentioned the less commonly used, but accepted perineal approach. The advantages and limitations of these various approaches were described in detail. The anticipated hospital and post-operative courses were reviewed. I highlighted the relevant anatomy, and we discussed the importance of neurovascular bundle preservation (nerve sparing) to minimize negative effects of surgery on erectile function. I explained that concurrent pelvic lymphadenectomy is routinely performed, and serves both a diagnostic and potentially therapeutic purpose if gio disease is identified. While in the majority of cases bilateral nerve sparing is appropriate and possible, he understands that in certain circumstances, intentional partial or complete unilateral or bilateral neurovascular bundle resection is necessary when there is suspicion that cancer extends into that region. This may be determined either preoperatively or intraoperatively. I discussed the general risks of surgery, which include, but are not limited to bleeding (potentially requiring a blood transfusion), infection, urinary fistula, vesicourethral anastomotic contracture, medical and anesthetic complications, or adjacent organ involvement or injury (ureters, bladder, pelvic vessels, rectum). We explained that the risk of rectal injury is <1% and that in most of cases the injury can be repaired primarily; however, a temporary colostomy is at times necessary. We discussed the common specific risks of prostatectomy, which include stress urinary incontinence- (commonly mild but  which can also be severe in rare circumstances), and erectile dysfunction--which can occur despite bilateral nerve-sparing. The patient understands that the majority of men will experience a decrease in the quality of their erections post-operatively, though the degree and duration of which vary depending on a range of patient-specific and surgical factors. Poor pre-operative erectile function, patient age, and the degree of nerve sparing are prognostic factors for the degree of post-operative erectile function.    I have discussed the risks, benefits and alternatives to the proposed procedure/treatment plan with the patient.  Our discussion also included the risks and benefits of the alternatives treatment options, including doing nothing. They were encouraged to ask questions and all questions were answered to their satisfaction.  At the end of our discussion they gave their verbal consent to the proposed procedure/treatment plan.  Scott De La Paz MD  7/24/2024  7:25 AM     Operative Report   ROBOTIC-ASSISTED LAPAROSCOPIC RADICAL PROSTATECTOMY (RALP) OPERATIVE NOTE    PREOPERATIVE DIAGNOSIS:  Intermediate risk prostate adenocarcinoma  POSTOPERATIVE DIAGNOSIS:  Same  PROCEDURES PERFORMED:  Robotic-assisted laparoscopic radical prostatectomy (CPT: 60146) = INPT Only Procedure  Bilateral pelvic lymphadenectomy  Anterior urethropexy (CPT: 60356) = INPT Only Procedure    ANESTHESIA:  General Endotracheal     ANTIBIOTIC PROPHYLAXIS:  Ancef     SPECIMENS:  Prostate and seminal vesicles  Bilateral pelvic lymph nodes  Periprostatic fat     ESTIMATED BLOOD LOSS:  250mL     DRAINS:  16F Austin catheter (15mL in balloon)  15F Roge Drain     COMPLICATIONS:  No immediate complications      INDICATIONS FOR PROCEDURE:  69 year old gentleman who was found to have intermediate risk prostate adenocarcinoma.  He was counseled about treatment options, and elected to proceed with a radical prostatectomy with bilateral pelvic  lymphadenectomy.  Please see my pre-operative and office notes for procedural counseling, including the risks of the procedure.      DESCRIPTION OF PROCEDURE:  After reviewing the indications for the procedure, informed consent was reviewed and signed by the patient.     The patient was brought to the operating room and placed in the supine position on the OR table.  SCD's were applied and all pressure points were carefully padded.  At this point, general endotracheal anesthesia was successfully induced.  The patient was then given perioperative antibiotics and subcutaneous enoxaparin prior to initiation of the procedure.  At this point, he remained in the supine position and his abdomen was prepped with chlorhexidine and his genitals and upper thighs were prepped with betadine solution.  He was draped in the usual sterile fashion.  A 16F zepeda catheter was placed using a sterile technique and the balloon was inflated with 15cc of sterile water. The bladder was drained fully.  We placed the patient in a steep Trendelenburg position to ensure there was no motion of the patient on the bed. At this point, a procedural timeout was performed where the patient and procedure were correctly identified using the patient's full name and date of birth.     A Veress needle was inserted above the umbilicus into the peritoneal cavity.  A drop test was performed to ensure there was no return of blood or enteric contents.  At this point insufflation was initiated and there were acceptably low opening pressures.  The abdomen was then insufflated to 15mmHg.  At this point a transverse 8mm incision was made in the supraumbilical region through which a non-bladed 8mm trocar was inserted into the peritoneal cavity.  The camera was then passed through this port and the peritoneum was inspected. There was no evidence of intraabdominal pathology.  There were no adhesions noted.       At this point, the remainder of the working ports were  placed under direct vision.  Two 8mm ports were placed in the left lower quadrant, an 8mm port was placed in the right lower quadrant, and a 12mm assistant port was placed in the far right lower quadrant.  Finally, a 5mm assistant port was placed in the right upper quadrant.  There was no evidence of injury to the intraperitoneal contents during port placement.  The patient was again placed in steep Trendelenburg position and the robot was then successfully docked.     All instruments were then placed under direct vision.  I began by taking down the attachments of the sigmoid colon to the left pelvic sidewall in order to identify the external iliac vessels and ensure mobility of the sigmoid colon and rectum.      Next, the posterior peritoneum was incised using electrocautery in order to enter the rectovesical cul-de-sac and identify the vas deferens and seminal vesicles bilaterally.  These structures were both dissected out carefully and the vasa were transected near the tips of the seminal vesicles.  Minimal use of cautery was employed near the tips of the seminal vesicles in order to avoid any injury to the neurovascular bundles. Once the seminal vesicles were dissected free, I then created a plane between the rectum and posterior prostate using a combination of blunt and sharp dissection.  Denonvilliers fascia was identified and dissection was carried distally to the posterior apex of the prostate.       After completing the posterior dissection, attention was the turned to mobilizing the bladder.  The right medial umbilical ligament was identified and grasped.  Using electrocautery, the space of Retzius was entered.  Dissection was carried laterally to the vas deferens and distally to the endopelvic fascia.  I then performed the same procedure on the left side of the bladder.  Finally, the urachus was divided using electrocautery to drop the bladder posteriorly.      The prostate was then defatted using blunt  dissection and electrocautery to fully expose the endopelvic fascia.  This fat was sent off for pathologic evaluation as \"periprostatic fat.\"  Next, the endopelvic fascia on the right was sharply entered. The levator muscles were swept laterally, with care not to injure them.  Dissection was carried towards the apex of the prostate, exposing the lateral aspect of the dorsal venous complex.  Vyas's ligament was divided with gentle bipolar electrocautery.  The same procedure was then performed on the left side.      After exposure of the dorsal venous complex, it was suture-ligated using a 2-0 V-Lock suture in a figure-of-eight fashion.  An anterior urethropexy was performed using the same suture to lift the urethra anteriorly and tack it to the periosteum of the pubic bone.       Attention was then turned to  the bladder neck from the base of the prostate.  The zepeda catheter was placed on gentle traction to delineate the junction of the bladder and prostate and electrocautery was used to dissect the bladder neck from the prostate.  The anterior bladder neck was entered, exposing the zepeda catheter.  The zepeda catheter was then placed on anterior traction in order to facilitate the posterior bladder neck dissection.  After coming through the posterior bladder neck, the posterior dissection plane was identified, exposing the previously dissected vasa and seminal vesicles.  Care was taken to avoid the ureteral orifices.  There was efflux of clear urine identified bilaterally. The vasa and seminal vesicles were then retracted anteriorly in order to delineate the prostatic pedicles.       The prostatic pedicles were then divided in an antegrade fashion using Hem-o-lock clips.  Care was taken to avoid the use of electrocautery near the pedicles in order to prevent inadvertent injury to the neurovascular bundles. The neurovascular bundles were then swept laterally off of the prostatic fascia in order to  preserve them.  This dissection was carried up to the prostatic apex. The quality of the nerve sparing was fair bilaterally.     Next, the dorsal venous complex was incised using electrocautery and sharp dissection.  After coming through the dorsal vein, hemostasis appeared to be good.  The urethra was then identified and blunt dissection was used to develop adequate urethral length. The urethra was then sharply divided anteriorly, the zepeda catheter was retracted, and the posterior urethra was sharply incised.  The prostate was then freed from the fossa and placed in the left upper quadrant for later retrieval.     After completing the prostatectomy portion of the procedure, attention was turned to the bilateral pelvic lymphadenectomy.  The right external iliac vein was identified and dissection was carried to the pelvic sidewall and down into the obturator space. The obturator nerve was identified and safely preserved.  The lymphatic packet was controlled proximally and distally using Hem-o-lock clips to minimize the risk of postoperative pelvic lymphocele.  The same procedure was then performed on the patient's left side.  All specimens, including the prostate and seminal vesicles, were then placed in an endo-catch bag for later retrieval.      Finally, the vesicourethral anastomosis was performed in a running fashion using two 3-0 V-Lock sutures tied together.  A new 16F zepeda catheter was placed at the completion of the anastomosis and the balloon was inflated with 10cc of sterile water.  It was irrigated successfully to ensure correct positioning.  The anastomosis was tested by filling the bladder with 120cc of sterile saline and it appeared to be water-tight, without obvious leaks.       A 15F round roberto carlos drain was passed through the far left lower quadrant 8mm port site and placed near the vesicourethral anastomosis.      At this point the abdomen was desufflated and 8mm periumbilical incision was widened  enough to accommodate removal of the endo-catch bag with the specimens inside.  The fascia was closed using interrupted 0 PDS sutures in a figure-of-eight fashion.  At this point the abdomen was re-insufflated and the camera was passed through the 12mm assistant port to inspect the peritoneum.  The fascial closure from the extraction site was well-approximated and there was no snared omentum or bowel.  At this point, all ports were removed under vision to assess for any bleeding.  Finally the 12mm assistant port was removed after complete desufflation of the peritoneum.      Skin was closed using 4-0 Monocryl sutures in a subcuticular fashion.  Surgical glue was placed over each incision site.       The patient was then awoken from anesthesia and transferred to the recovery room in stable condition.     He tolerated the procedure well without any complications.     At the completion of the procedure, our sponge, instrument, and needle count was correct.     Rajwinder Veronica served as my bedside assistant for the entirety of the procedure.  I was present for the entirety of the procedure and completed all major portions myself.     PLAN:  He will be admitted to the surgical floor post-operatively for 1-2 nights. His zepeda catheter will remain in place until post-operative follow-up.   Scott De La Paz MD  7/24/2024 12:50 PM     Hospitalist   Post op medicine consult    69 year old male w/ PMHx of eHTN, HLD, STEVEN, Obesity BMI 36, DM2, ascending aortic aneurysm, mildly elevated coronary calcium score, Intermediate risk prostate cancer who presented for surgery. S/p Robotic assisted laparoscopic radical prostatectomy, bilateral pelvic lymphadenectomy, anterior urethropexy with Dr. De La Paz.      Patient seen in the pacu, denies fevers, chills, cough, dyspnea, chest pains, numbness, tingling, focal weakness, he does report abd pains, 7/10, non radiating, mostly incisional, he has received fentanyl and dilaudid which has alleviated his  pains, pain worse w/ motion.     Blood pressure 148/84, pulse 77, temperature 97 °F (36.1 °C), temperature source Temporal, resp. rate 14, height 5' 10\" (1.778 m), weight 255 lb (115.7 kg), SpO2 93%.     Component Value Date     ALB 4.2 07/24/2024     ALKPHO 73 07/24/2024     BILT 0.6 07/24/2024     TP 6.5 07/24/2024     AST 20 07/24/2024     ALT 22 07/24/2024     PTT 28.3 07/24/2024     INR 1.00 07/24/2024     Assessment/Plan:   69 year old male w/ PMHx of eHTN, HLD, STEVEN, Obesity BMI 36, DM2, ascending aortic aneurysm, mildly elevated coronary calcium score, Intermediate risk prostate cancer who presented for surgery. S/p Robotic assisted laparoscopic radical prostatectomy, bilateral pelvic lymphadenectomy, anterior urethropexy with Dr. De La Paz.      Intermediate risk prostate cancer    S/p Robotic assisted laparoscopic radical prostatectomy, bilateral pelvic lymphadenectomy, anterior urethropexy with Dr. De La Paz 07/24/24   H/o bph  Post op pain   - mgmt per urology  - zepeda mgmt per uro - plan to have decath trial in clinic   - check post op labs   - cont flomax   - norco prn  - iv dilaudid prn      DM2  - hold home agents  - ssi      eHTN  HLD  - norvasc 10mg/d w/ hold parameters   - hydrochlorothiazide 25mg/d - hold - resume once off iv fluids  - labetalol 200mg bid w/ hold parameters   - losartan 100mg daily w/ hold parameters   - atorva in place of lovastatin      DVT ppx: lovenox   Code Status: full     Dispo: per urology   Jesus White, DO   7/24/2024  1:15 PM     7/25/2024:  Urology Progress Note   +abdominal pain, discomfort from zepeda catheter. No nausea, vomiting, fever, or chills. No CP, SOB, or calf pain.     Blood pressure 149/76, pulse 83, temperature 99 °F (37.2 °C), temperature source Axillary, resp. rate 16, height 5' 10\" (1.778 m), weight 255 lb (115.7 kg), SpO2 97%.      MARTHA drain with SS fluid (output - 60 mL total yesterday)     Lab 07/24/24  1312 07/25/24  0546   RBC 4.19 3.81   HGB 12.7* 11.7*    HCT 39.1 35.7*   MCV 93.3 93.7   MCH 30.3 30.7   MCHC 32.5 32.8   RDW 13.6 14.0   WBC 11.2* 9.9   .0 215.0     * 156*   BUN 12 9   CREATSERUM 1.14 0.98   EGFRCR 70 83   CA 8.8 8.6*   * 140   K 4.4 4.6    107   CO2 25.0 29.0     Drain creat 0.8     Plan:    Encouraged ambulation and use of IS x 10/hr  Advance diet as tolerated  Pain management  Bowel regimen  Follow labs, UOP, drain output  MARTHA drain creat - Drain can be removed  CPM with zepeda catheter  Hospitalist following  Scott De La Paz MD   7/25/2024  2:40 PM     Hospitalist Progress Note   still is having abdominal pain. Patient denies any significant flatus or bowel movements   will need clear liquid diet at this time     Temp:  [97 °F (36.1 °C)-98.6 °F (37 °C)] 98.6 °F (37 °C)  Pulse:  [61-88] 77  Resp:  [11-19] 16  BP: (128-148)/(67-89) 128/69  SpO2:  [89 %-99 %] 98 %    Lab 07/24/24  0621 07/24/24  1251 07/24/24  1443 07/24/24  2120   PGLU 163* 193* 196* 175*      Post op pain   - mgmt per urology  - zepead mgmt per uro - plan to have decath trial in clinic   - check post op labs   - cont flomax ??  - oxy po prn  - iv dilaudid prn      DM2  - hold home agents  - ssi      eHTN  HLD  - norvasc 10mg/d w/ hold parameters   - hydrochlorothiazide 25mg/d - hold - resume once off iv fluids  - labetalol 200mg bid w/ hold parameters   - losartan 100mg daily w/ hold parameters   - atorvastatin in place of lovastatin      DVT ppx: lovenox   Code Status: full     Dispo: per urology   Gagan Paz  7/25/2024  5:29 AM     7/26/2024:  Urology Progress Note   Pain improving. No nausea, vomiting, fever, or chills. Tolerating full liquids. +flatus. Small BM overnight. No CP, SOB, or calf pain. Ambulating     Blood pressure 158/81, pulse 75, temperature 98.3 °F (36.8 °C), temperature source Oral, resp. rate 18, height 5' 10\" (1.778 m), weight 255 lb (115.7 kg), SpO2 96%.     Lab 07/24/24  1312 07/25/24  0546 07/26/24  0558   RBC 4.19 3.81 3.84   HGB  12.7* 11.7* 11.6*   HCT 39.1 35.7* 35.8*   MCV 93.3 93.7 93.2   MCH 30.3 30.7 30.2   MCHC 32.5 32.8 32.4   RDW 13.6 14.0 14.2   WBC 11.2* 9.9 10.9   .0 215.0 215.0     * 156* 153*   BUN 12 9 7*   CREATSERUM 1.14 0.98 0.79   EGFRCR 70 83 96   CA 8.8 8.6* 8.7   * 140 137   K 4.4 4.6 3.9    107 105   CO2 25.0 29.0 27.0     PLAN:  Encouraged ambulation and use of IS x 10/hr  Advance diet as tolerated  Pain management  Bowel regimen  Follow labs, UOP, drain output  CPM with zepeda catheter  Hospitalist following  Possible DC today pending clinical course and approval from hospitalist.    Scott De La Paz MD   7/26/2024     Dispo: medically stable for dc  Gagan Paz   7/26/2024  1:33 PM     MEDICATIONS ADMINISTERED:  Medications 07/24/24 07/25/24 07/26/24   amLODIPine (Norvasc) tab 10 mg  Dose: 10 mg  Freq: Daily Route: OR  Start: 07/24/24 1500 End: 07/26/24 1723    1554 SK-Given        0802 SK-Given        0836 ARNULOF-Given   1723-D/C'd       atorvastatin (Lipitor) tab 10 mg  Dose: 10 mg  Freq: Nightly Route: OR  Start: 07/24/24 2100 End: 07/26/24 1723    2144 RM-Given        2115 MV-Given        1723-D/C'd        ceFAZolin (Ancef) 2g in 10mL IV syringe premix  Dose: 2 g  Freq: Once Route: IV  Start: 07/24/24 0615 End: 07/24/24 0824   Admin Instructions:   Re-dose if surgery lasts longer than 4 hours   Order specific questions:       0824 JR-Given            docusate sodium (Colace) cap 100 mg  Dose: 100 mg  Freq: 2 times daily Route: OR  Start: 07/24/24 2100 End: 07/26/24 1723   Admin Instructions:   Do not crush    2144 RM-Given        0802 SK-Given   2115 MV-Given       0836 ARNULFO-Given   1723-D/C'd       enoxaparin (Lovenox) 40 MG/0.4ML SUBQ injection 40 mg  Dose: 40 mg  Freq: Daily Route: SC  Start: 07/25/24 0800 End: 07/26/24 1723   Admin Instructions:   Only administer Enoxaparin subcutaneously into the abdomen unless directed otherwise by a physician. Alternate injection sites between the left  and right anterolateral and left and right posterolateral abdominal wall.     0803 SK-Given        0837 ARNULFO-Given   1723-D/C'd       enoxaparin (Lovenox) 40 MG/0.4ML SUBQ injection 40 mg  Dose: 40 mg  Freq: Once Route: SC  Start: 07/24/24 0615 End: 07/24/24 0715   Admin Instructions:   Only administer Enoxaparin subcutaneously into the abdomen unless directed otherwise by a physician. Alternate injection sites between the left and right anterolateral and left and right posterolateral abdominal wall.    0715 NR-Given            insulin aspart (NovoLOG) 100 Units/mL FlexPen 1-10 Units  Dose: 1-10 Units  Freq: 3 times daily before meals and nightly Route: SC  Start: 07/24/24 1600 End: 07/26/24 1723   Admin Instructions:   Correction Insulin - calculate insulin requirement  Give 1 unit of Novolog (aspart) insulin for every 20 points blood glucose is greater than 140 mg/dL  **DO NOT HOLD OR ALTER INSULIN DOSE WITHOUT A PHYSICIAN ORDER**  Give Novolog/aspart insulin subcutaneously within 30 minutes of scheduled finger-stick time  Notify physician for blood glucose >351mg/dL with time and last dose of correction insulin given.    1728 SK-Given   2144 RM-Given       (0700 MV)-Not Given   1132 SK-Given   (1600 SK)-Not Given     2117 MV-Given        0834 ARNULFO-Given   1320 ARNULFO-Given        1723-D/C'd        labetalol (Normodyne) tab 200 mg  Dose: 200 mg  Freq: 2 times daily Route: OR  Start: 07/24/24 2100 End: 07/26/24 1723    2144 RM-Given        0802 SK-Given   2115 MV-Given       0836 ARNULFO-Given   1723-D/C'd       losartan (Cozaar) tab 100 mg  Dose: 100 mg  Freq: Daily Route: OR  Start: 07/24/24 1500 End: 07/26/24 1723    1554 SK-Given        0802 SK-Given        0836 ARNULFO-Given   1723-D/C'd       sennosides (Senokot) tab 17.2 mg  Dose: 17.2 mg  Freq: Nightly Route: OR  Start: 07/24/24 2100 End: 07/26/24 1723    (2100 MV)-Not Given        2115 MV-Given        1723-D/C'd          lactated ringers infusion  Rate: 100 mL/hr  Freq:  Continuous Route: IV  Last Dose: Stopped (07/24/24 1459)  Start: 07/24/24 0800 End: 07/24/24 1425    1303 ML-Rate/Dose Change   1425-D/C'd  1459 MG-Stopped           lactated ringers infusion  Rate: 20 mL/hr  Freq: Continuous Route: IV  Start: 07/24/24 0615 End: 07/24/24 1449   Admin Instructions:   TKO    0712 NR-New Bag   0759 JR-Continued by Anesthesia   0801 JR-Paused [C]     0802 JR-Restarted   0808 JR-New Bag   1217 JR-New Bag     1449-D/C'd          sodium chloride 0.9% infusion  Rate: 75 mL/hr  Freq: Continuous Route: IV  Last Dose: Stopped (07/25/24 2120)  Start: 07/24/24 1500 End: 07/26/24 1723    1459 MG-New Bag   2150 RM-New Bag       0536 MV-Rate/Dose Change   2120 MV-Stopped [C]       1723-D/C'd          bacitracin 500 UNIT/GM ointment  Freq: Every 8 hours PRN Route: TOP  PRN Reason: Wound care  PRN Comment: apply to tip of penis q shift as needed  Start: 07/24/24 1449 End: 07/26/24 1723   Order specific questions:        0112 MV-Given        1723-D/C'd        benzocaine-menthol (Cepacol) lozenge 1 lozenge  Dose: 1 lozenge  Freq: Every 15 min PRN Route: BU  PRN Reason: sore throat  Start: 07/24/24 1449 End: 07/26/24 1723   Admin Instructions:   May leave at bedside     1812 SK-Given        1723-D/C'd         fentaNYL (Sublimaze) 50 mcg/mL injection 25 mcg  Dose: 25 mcg  Freq: Every 5 min PRN Route: IV  PRN Reason: moderate pain  Start: 07/24/24 0750 End: 07/24/24 1425   Admin Instructions:   Total maximum of 100 mcg.  Use PRN reason as a guide and follow range order policy.  Use as first line medication.    1316 ML-Given   1322 ML-Given   1342 ML-Given     1425-D/C'd          Or   HYDROmorphone (Dilaudid) 1 MG/ML injection 0.4 mg  Dose: 0.4 mg  Freq: Every 5 min PRN Route: IV  PRN Reason: moderate pain  Start: 07/24/24 0750 End: 07/24/24 0699   Admin Instructions:   Total maximum amount 3mg.  Use PRN reason as a guide and follow range order policy. Use as second line medication if first line narcotic  is ineffective.    1335 ML-Given   1425-D/C'd         oxybutynin (Ditropan) tab 5 mg  Dose: 5 mg  Freq: Every 6 hours PRN Route: OR  PRN Reason: bladder spasms  PRN Comment: bladder spasms  Start: 07/24/24 1449 End: 07/26/24 1723     0101 MV-Given        1723-D/C'd         oxyCODONE immediate release partial tab 2.5 mg  Dose: 2.5 mg  Freq: Every 4 hours PRN Route: OR  PRN Reason: moderate pain  Start: 07/24/24 1449 End: 07/26/24 1723   Admin Instructions:   Use PRN reason as a guide and follow range order policy                         2205 MV-Given              1723-D/C'd      Or   oxyCODONE immediate release tab 5 mg  Dose: 5 mg  Freq: Every 4 hours PRN Route: OR  PRN Reason: severe pain  Start: 07/24/24 1449 End: 07/26/24 1723   Admin Instructions:   Use PRN reason as a guide and follow range order policy    1727 SK-Given   2150 RM-Given       0622 MV-Given   1051 SK-Given   1807 SK-Given             0446 MV-Given   0850 ARNULFO-Given   1723-D/C'd        Vitals (last day) before discharge       Date/Time Temp Pulse Resp BP SpO2 Weight O2 Device O2 Flow Rate (L/min) [facility]    07/26/24 1210 99.6 °F (37.6 °C) 77 18 125/60 93 % -- -- -- IP    07/26/24 1210 -- -- -- -- -- -- None (Room air) -- ARNULFO    07/26/24 0835 -- -- -- -- -- -- Nasal cannula 1 L/min AM    07/26/24 0747 99.3 °F (37.4 °C) 79 18 146/76 93 % -- Nasal cannula 1 L/min IP    07/26/24 0432 98.3 °F (36.8 °C) 75 18 158/81 96 % -- Nasal cannula 1 L/min [place]    07/26/24 0000 98.7 °F (37.1 °C) 69 -- 152/82 92 % -- Nasal cannula 1 L/min [place]    07/25/24 2030 99.4 °F (37.4 °C) 85 18 141/70 93 % -- Nasal cannula 1 L/min [place]    07/25/24 1700 98 °F (36.7 °C) 73 19 149/70 98 % -- None (Room air) --     07/25/24 1300 -- 71 -- -- -- -- -- --     07/25/24 1129 98.1 °F (36.7 °C) 60 18 139/72 97 % -- None (Room air) --     07/25/24 0800 98 °F (36.7 °C) 63 17 131/69 93 % -- None (Room air) --     07/25/24 0652 -- 64 15 118/60 95 % -- None (Room air) 0 L/min MV    07/25/24 0609  98.7 °F (37.1 °C) 65 14 127/62 96 % -- Nasal cannula 1 L/min JBA    07/25/24 0500 -- -- -- -- -- -- -- 1 L/min MV    07/25/24 0051 99 °F (37.2 °C) 83 16 149/76 97 % -- Nasal cannula 2 L/min MV     07/24/24 2119 98.6 °F (37 °C) 77 16 128/69 98 % -- Nasal cannula 3 L/min JBA   07/24/24 1723 98.1 °F (36.7 °C) 72 16 148/71 99 % -- Nasal cannula 3 L/min NW   07/24/24 1547 -- 61 -- -- 96 % -- -- -- NW   07/24/24 1450 -- -- -- -- -- 255 lb (115.7 kg) -- -- MG   07/24/24 1433 97.7 °F (36.5 °C) 69 14 135/67 92 % -- Nasal cannula 3 L/min NW   07/24/24 1400 97.2 °F (36.2 °C) -- -- -- -- -- Nasal cannula 3 L/min ML   07/24/24 1350 -- 77 11 140/69 94 % -- -- 3 L/min ML   07/24/24 1335 -- 67 13 148/89 94 % -- Nasal cannula 3 L/min ML   07/24/24 1320 -- 67 16 139/79 95 % -- -- 3 L/min ML   07/24/24 1305 -- 77 14 148/84 93 % -- -- 3 L/min ML   07/24/24 1300 -- 79 19 147/85 93 % -- -- -- ML   07/24/24 1255 -- 83 19 138/76 89 % Abnormal  -- -- 4 L/min ML   07/24/24 1250 97 °F (36.1 °C) 88 13 138/87 92 % -- Nasal cannula 3 L/min ML   07/24/24 0613 97.6 °F (36.4 °C) 61 16 138/78 97 % 255 lb (115.7 kg) -- --        FOR REVIEW/APPROVAL OF INPT ADMISSION

## 2024-07-30 NOTE — PAYOR COMM NOTE
--------------  ADMISSION REVIEW     Secondary Payor: ADRIENNE CUEVAS O  Subscriber #:  [id number]  Secondary Payor Authorization Number: N/A    Admit date: 24  Admit time:  2:36 PM       REVIEW DOCUMENTATION:  Urology Pre OP H&P  Chief Complaint:  Prostate Cancer  History of Present Illness:  69 year old male who presents today for evaluation of intermediate risk prostate adenocarcinoma.  PSA of 9.77 in . Prostate MRI in  showed a 57mL gland with a PI-RADS IV lesion.  Subsequent prostate biopsy with 1 core of GG1 prostate adenocarcinoma, and 9 cores of GG2 prostate  adenocarcinoma.  He presents today to discuss treatment options for his unfavorable intermdiate risk prostate cancer.  He has mild/mod LUTS and is on tamsulosin 0.4mg nightly.  Erections are \"fine\" with sildenafil.  He denies a family history of prostate cancer.  He denies an abdominal surgical history. Takes ASA 81mg daily.  Past Medical History:  Benign localized prostatic hyperplasia with lower urinary tract symptoms (LUTS)  Payor Comm Note signed by Liyah Izaguirre at 2024 11:15 AM  Author: Liyah Izaguirre Author  Type:   Filed: 2024 11:15 AM  Note Status: Signed Cosign: Cosign Not Required Date of  Service:  2024 10:41 AM  : Liyah Izaguirre  ()  Tara Fernando ANN (MR # [id number]) Printed at 2024 11:15 AM Page 1 of 16  CONFIDENTIAL INFORMATION [address] [address]  Admission Review  Fernando Pacheco  MRN: [id number], : 1955, Legal Sex: M  Acct #: [id number]  Visit Start: 2024, Inpt Admit Date: 2024,  Discharge Date: 2024  Review Notes (continued)  High blood pressure  High cholesterol  Hypercholesterolemia  Kidney disease  Obesity, Class II, BMI 35-39.9 10/31/2019  Obesity, unspecified  Other and unspecified hyperlipidemia  OTHER DISEASES   colon polyps  Type II or unspecified type diabetes mellitus  without mention of complication, not stated as uncontrolled  Unspecified essential hypertension  Unspecified sleep apnea  not using CPAP  Visual impairment  glasses  Past Surgical History:  Procedure Laterality Date  COLONOSCOPY   due   COLONOSCOPY 2020  Dr Epps: adenoma, tics, int hemorrhoids - 5 year recall  COLONOSCOPY,BIOPSY 2014  Procedure: ; Surgeon: Micky Banegas MD; [place] [facility] [place] [facility] [place], [facility]  COLONOSCOPY,REMV LESN,SNARE N/A 2014  TEREZA Banegas. 5 adenomas, (2 large), diverticuli, hemorrhoids, repeat  w/split 4L prep.  OTHER SURGICAL HISTORY   Left knee arthroscopy  OTHER SURGICAL HISTORY   nasal or sinus surgery, not certain  OTHER SURGICAL HISTORY 2017  cysto w/ Dr. Montalvo  OTHER SURGICAL HISTORY 2024  PnBx - dr. De La Paz  SKIN SURGERY Left 2024  SCC in situ, Left antihelix, MMS with Dr. Cooper  SKIN SURGERY 2024  MMS SCCIS LEFT ANTIHELIX DR NASH  Allergies:  Ace Inhibitors  Review of Systems:  General: Denies anorexia, weight loss, fevers, chills, night sweats, or other constitutional symptoms.  Neurologic: Denies headaches, stiff neck, photophobia, numbness, or weakness of extremities.  Psychiatric: Denies anxiety, depression.  Eyes: Denies vision changes.  Skin: Denies skin changes or rash.  Cardiac: Denies chest pain, palpitations, or orthopnea.  Fernando Pacheco (MR # [id number]) Printed at 2024 11:15 AM Page 2 of 16  [facility] INFORMATION [address] [address]  Admission Review  Fernando Pacheco  MRN: [id number], : 1955, Legal Sex: M  [id number]  Visit Start: 2024, Inpt Admit Date: 2024,  Discharge Date: 2024  Review Notes (continued)  Pulmonary: Denies cough, hemoptysis, shortness of breath, or dyspnea on exertion.  GI: Denies abdominal pain, nausea, vomiting, or changes in bowel movements.  Musculoskeletal: Denies extremity pain, weakness.  : See HPI.  Physical  Examination:  General: Awake, Alert, Oriented. Well-nourished. Appears stated age.  Neurologic: No focal deficits. Normal gait.  HEENT: EOMI. No scleral icterus.  Cardiac: Regular rate and rhythm.  Respiratory: Non-labored respirations.  Abdomen: Soft, Non-tender, non-distended. No palpable masses. No costovertebral angle tenderness.  Extremities: Warm, well-perfused. No palpable edema.  Skin: Normal-appearing. No rash or lesions.  Genitourinary: Deferred  Laboratory Review:  Component  Latest Ref Rng 2023 3/20/2024  PSA  0.01 - 4 ng/mL 7.58 (H) 9.77 (H)  Radiology Review:  I have personally reviewed all pertinent imaging.  MRI Prostate 2024:  1. Index peripheral zone lesion within the left posteromedial prostate [id number].  2. Background of prostatitis and benign prostatic hyperplasia.  Pathology Review:  Prostate Biopsy 2024:  A. Prostate, right base, biopsy:  -Adenocarcinoma of the prostate, grade group 2 (Watertown score 3 + 4 = 7).  -Tumor in 1 of 2 cores, 1 mm involving 5% of submitted tissue.  B. Prostate, right mid, biopsy:  -Adenocarcinoma of the prostate, grade group 1 (Watertown score 3 + 3 = 6).  -Tumor in 1 of 2 cores, 3 mm involving 10% of submitted tissue.  C. Prostate, right apex, biopsy:  -Benign prostatic tissue.  D. Prostate, left base, biopsy:  -Adenocarcinoma of the prostate, grade group 2 ([place]town score 3 + 4 = 7).  -Tumor in 2 of 2 cores, 18 mm involving 80% of submitted tissue.  E. Prostate, left mid, biopsy:  -Adenocarcinoma of the prostate, grade group 2 ([place]town score 3 + 4 = 7).  -Tumor in 2 of 2 cores, 16 mm involving 70% of submitted tissue.  Fernando Pacheco (MR # [id number]) Printed at 2024 11:15 AM Page 3 of 16  CONFIDENTIAL INFORMATION [address] [address]  Admission Review  Fernando Pacheco  MRN: [id number], : 1955, Legal Sex: M  Acct #: [id number]  Visit Start: 2024, Inpt Admit Date: 2024,  Discharge Date:  7/26/2024  Review Notes (continued)  F. Prostate, left apex, biopsy:  -Adenocarcinoma of the prostate, grade group 2 (Korin score 3 + 4 = 7).  -Tumor in 1 of 2 cores, 4 mm involving 20% of submitted tissue.  -Perineural invasion is present.  G. Prostate, target region, biopsy:  -Adenocarcinoma of the prostate, grade group 2 (Korin score 3 + 4 = 7).  -Tumor in 3 of 3 cores, 12 mm involving 30% of submitted tissue.  Assessment:  In summary, 69 year old male with unfavorable intermediate risk prostate adenocarcinoma.  Plan:  After completing a physical examination and reviewing the patient's history and laboratory and pathology  findings presented today, we discussed with the patient his diagnosis of prostate cancer. We reviewed the  clinical staging system for prostate cancer and the prognostic significance of his biopsy pathology findings  (Newark score), clinical stage, and serum PSA in predicting both pathologic stage found at surgery as well as  the long-term prognosis following surgical or radiation therapy. We discussed how these data can be used to  stratify patients as low-risk, intermediate-risk and high-risk for post-therapy failure and can direct choice of  treatment appropriate to their risk assessment.  We reviewed the primary modes of treatment for prostate cancer: active surveillance, radiation therapy (XRT),  radical prostatectomy, cryotherapy, high intensity focused ultrasound (HIFU) and hormonal therapy.  Regarding surgery, I explained the primary operation that I primarily perform, robotic-assisted laparoscopic  radical prostatectomy, and compared it with the alternative that I am also experienced with traditional, open  radical retropubic prostatectomy (RRP). I also mentioned the less commonly used, but accepted perineal  approach. The advantages and limitations of these various approaches were described in detail. The  anticipated hospital and post-operative courses were reviewed. I  highlighted the relevant anatomy, and we  discussed the importance of neurovascular bundle preservation (nerve sparing) to minimize negative effects of  surgery on erectile function. I explained that concurrent pelvic lymphadenectomy is routinely performed, and  serves both a diagnostic and potentially therapeutic purpose if gio disease is identified. While in the majority  of cases bilateral nerve sparing is appropriate and possible, he understands that in certain circumstances,  intentional partial or complete unilateral or bilateral neurovascular bundle resection is necessary when there is  suspicion that cancer extends into that region. This may be determined either preoperatively or intraoperatively.  I discussed the general risks of surgery, which include, but are not limited to bleeding (potentially requiring a  blood transfusion), infection, urinary fistula, vesicourethral anastomotic contracture, medical and anesthetic  complications, or adjacent organ involvement or injury (ureters, bladder, pelvic vessels, rectum). We explained  that the risk of rectal injury is <1% and that in most of cases the injury can be repaired primarily; however, a  temporary colostomy is at times necessary. We discussed the common specific risks of prostatectomy, which  include stress urinary incontinence- (commonly mild but which can also be severe in rare circumstances), and  erectile dysfunction--which can occur despite bilateral nerve-sparing. The patient understands that the majority  of men will experience a decrease in the quality of their erections post-operatively, though the degree and  duration of which vary depending on a range of patient-specific and surgical factors. Poor pre-operative erectile  function, patient age, and the degree of nerve sparing are prognostic factors for the degree of post-operative  erectile function.  I have discussed the risks, benefits and alternatives to the proposed procedure/treatment  plan with the patient.  Our discussion also included the risks and benefits of the alternatives treatment options, including doing  Fernando Pacheco (MR # [id number]) Printed at 2024 11:15 AM Page 4 of 16  [address] [address]  Admission Review  Fernando Pacheco  MRN: [id number], : 1955, Legal Sex: M  Acct #: [id number]  Visit Start: 2024, Inpt Admit Date: 2024,  Discharge Date: 2024  Review Notes (continued)  nothing. They were encouraged to ask questions and all questions were answered to their satisfaction. At the  end of our discussion they gave their verbal consent to the proposed procedure/treatment plan.  Scott De La Paz MD  2024 7:25 AM  Operative Report  ROBOTIC-ASSISTED LAPAROSCOPIC RADICAL PROSTATECTOMY (RALP) OPERATIVE NOTE  PREOPERATIVE DIAGNOSIS:  Intermediate risk prostate adenocarcinoma  POSTOPERATIVE DIAGNOSIS:  Same  PROCEDURES PERFORMED:  1. Robotic-assisted laparoscopic radical prostatectomy (CPT: 00651) = INPT Only Procedure  2. Bilateral pelvic lymphadenectomy  3. Anterior urethropexy (CPT: 38335) = INPT Only Procedure  ANESTHESIA:  General Endotracheal  ANTIBIOTIC PROPHYLAXIS:  Ancef  SPECIMENS:  1. Prostate and seminal vesicles  2. Bilateral pelvic lymph nodes  3. Periprostatic fat  ESTIMATED BLOOD LOSS:  250mL  DRAINS:  1. 16F Austin catheter (15mL in balloon)  2. 15F Roge Drain  COMPLICATIONS:  No immediate complications  INDICATIONS FOR PROCEDURE:  69 year old gentleman who was found to have intermediate risk prostate adenocarcinoma. He was  counseled about treatment options, and elected to proceed with a radical prostatectomy with bilateral  pelvic lymphadenectomy. Please see my pre-operative and office notes for procedural counseling,  including the risks of the procedure.  DESCRIPTION OF PROCEDURE:  After reviewing the indications for the procedure, informed consent was reviewed and signed by the  patient.  The patient was brought to the operating room and placed in the supine position on the OR table. SCD's were  applied and all pressure points were carefully padded. At this point, general endotracheal anesthesia was  Fernando Pacheco (MR # [id number]) Printed at 2024 11:15 AM Page 5 of 16  [address] [address]  Admission Review  Fernando Pacheco  MRN: [id number], : 1955, Legal Sex: M  Acct #: [id number]  Visit Start: 2024, Inpt Admit Date: 2024,  Discharge Date: 2024  Review Notes (continued)  successfully induced. The patient was then given perioperative antibiotics and subcutaneous enoxaparin prior  to initiation of the procedure. At this point, he remained in the supine position and his abdomen was prepped  with chlorhexidine and his genitals and upper thighs were prepped with betadine solution. He was draped in  the usual sterile fashion. A 16F zepeda catheter was placed using a sterile technique and the balloon was  inflated with 15cc of sterile water. The bladder was drained fully. We placed the patient in a steep  Trendelenburg position to ensure there was no motion of the patient on the bed. At this point, a procedural  timeout was performed where the patient and procedure were correctly identified using the patient's full name  and date of birth.  A Veress needle was inserted above the umbilicus into the peritoneal cavity. A drop test was performed to  ensure there was no return of blood or enteric contents. At this point insufflation was initiated and there were  acceptably low opening pressures. The abdomen was then insufflated to 15mmHg. At this point a transverse  8mm incision was made in the supraumbilical region through which a non-bladed 8mm trocar was inserted into  the peritoneal cavity. The camera was then passed through this port and the peritoneum was inspected. There  was no evidence of  intraabdominal pathology. There were no adhesions noted.  At this point, the remainder of the working ports were placed under direct vision. Two 8mm ports were placed  in the left lower quadrant, an 8mm port was placed in the right lower quadrant, and a 12mm assistant port was  placed in the far right lower quadrant. Finally, a 5mm assistant port was placed in the right upper quadrant.  There was no evidence of injury to the intraperitoneal contents during port placement. The patient was again  placed in steep Trendelenburg position and the robot was then successfully docked.  All instruments were then placed under direct vision. I began by taking down the attachments of the sigmoid  colon to the left pelvic sidewall in order to identify the external iliac vessels and ensure mobility of the sigmoid  colon and rectum.  Next, the posterior peritoneum was incised using electrocautery in order to enter the rectovesical cul-de-sac  and identify the vas deferens and seminal vesicles bilaterally. These structures were both dissected out  carefully and the vasa were transected near the tips of the seminal vesicles. Minimal use of cautery was  employed near the tips of the seminal vesicles in order to avoid any injury to the neurovascular bundles. Once  the seminal vesicles were dissected free, I then created a plane between the rectum and posterior prostate  using a combination of blunt and sharp dissection. Denonvilliers fascia was identified and dissection was  carried distally to the posterior apex of the prostate.  After completing the posterior dissection, attention was the turned to mobilizing the bladder. The right medial  umbilical ligament was identified and grasped. Using electrocautery, the space of Retzius was entered.  Dissection was carried laterally to the vas deferens and distally to the endopelvic fascia. I then performed the  same procedure on the left side of the bladder. Finally, the urachus was divided  using electrocautery to drop  the bladder posteriorly.  The prostate was then defatted using blunt dissection and electrocautery to fully expose the endopelvic fascia.  This fat was sent off for pathologic evaluation as \"periprostatic fat.\" Next, the endopelvic fascia on the right  was sharply entered. The levator muscles were swept laterally, with care not to injure them. Dissection was  carried towards the apex of the prostate, exposing the lateral aspect of the dorsal venous complex. Vyas's  ligament was divided with gentle bipolar electrocautery. The same procedure was then performed on the left  side.  After exposure of the dorsal venous complex, it was suture-ligated using a 2-0 V-Lock suture in a figure-ofeight  fashion. An anterior urethropexy was performed using the same suture to lift the urethra anteriorly and  Fernando Pacheco (MR # [id number]) Printed at 2024 11:15 AM Page 6 of 16  [address] [address]  Admission Review  Fernando Pacheco  MRN: [id number], : 1955, Legal Sex: M  Acct #: [id number]  Visit Start: 2024, Inpt Admit Date: 2024,  Discharge Date: 2024  Review Notes (continued)  tack it to the periosteum of the pubic bone.  Attention was then turned to  the bladder neck from the base of the prostate. The zepeda catheter was  placed on gentle traction to delineate the junction of the bladder and prostate and electrocautery was used to  dissect the bladder neck from the prostate. The anterior bladder neck was entered, exposing the zepeda  catheter. The zepeda catheter was then placed on anterior traction in order to facilitate the posterior bladder neck  dissection. After coming through the posterior bladder neck, the posterior dissection plane was identified,  exposing the previously dissected vasa and seminal vesicles. Care was taken to avoid the ureteral orifices.  There was efflux of clear urine  identified bilaterally. The vasa and seminal vesicles were then retracted  anteriorly in order to delineate the prostatic pedicles.  The prostatic pedicles were then divided in an antegrade fashion using Hem-o-lock clips. Care was taken to  avoid the use of electrocautery near the pedicles in order to prevent inadvertent injury to the neurovascular  bundles. The neurovascular bundles were then swept laterally off of the prostatic fascia in order to preserve  them. This dissection was carried up to the prostatic apex. The quality of the nerve sparing was fair bilaterally.  Next, the dorsal venous complex was incised using electrocautery and sharp dissection. After coming through  the dorsal vein, hemostasis appeared to be good. The urethra was then identified and blunt dissection was  used to develop adequate urethral length. The urethra was then sharply divided anteriorly, the zepeda catheter  was retracted, and the posterior urethra was sharply incised. The prostate was then freed from the fossa and  placed in the left upper quadrant for later retrieval.  After completing the prostatectomy portion of the procedure, attention was turned to the bilateral pelvic  lymphadenectomy. The right external iliac vein was identified and dissection was carried to the pelvic sidewall  and down into the obturator space. The obturator nerve was identified and safely preserved. The lymphatic  packet was controlled proximally and distally using Hem-o-lock clips to minimize the risk of postoperative pelvic  lymphocele. The same procedure was then performed on the patient's left side. All specimens, including the  prostate and seminal vesicles, were then placed in an endo-catch bag for later retrieval.  Finally, the vesicourethral anastomosis was performed in a running fashion using two 3-0 V-Lock sutures tied  together. A new 16F zepeda catheter was placed at the completion of the anastomosis and the balloon was  inflated with 10cc of  sterile water. It was irrigated successfully to ensure correct positioning. The anastomosis  was tested by filling the bladder with 120cc of sterile saline and it appeared to be water-tight, without obvious  leaks.  A 15F round roberto carlos drain was passed through the far left lower quadrant 8mm port site and placed near the  vesicourethral anastomosis.  At this point the abdomen was desufflated and 8mm periumbilical incision was widened enough to  accommodate removal of the endo-catch bag with the specimens inside. The fascia was closed using  interrupted 0 PDS sutures in a figure-of-eight fashion. At this point the abdomen was re-insufflated and the  camera was passed through the 12mm assistant port to inspect the peritoneum. The fascial closure from the  extraction site was well-approximated and there was no snared omentum or bowel. At this point, all ports were  removed under vision to assess for any bleeding. Finally the 12mm assistant port was removed after complete  desufflation of the peritoneum.  Skin was closed using 4-0 Monocryl sutures in a subcuticular fashion. Surgical glue was placed over each  incision site.  Fernando Pacheco (MR # [id number]) Printed at 2024 11:15 AM Page 7 of 16  CONFIDENTIAL INFORMATION [address] [address]  Admission Review  Fernando Pacheco  MRN: [id number], : 1955, Legal Sex: M  Acct #: [id number]  Visit Start: 2024, Inpt Admit Date: 2024,  Discharge Date: 2024  Review Notes (continued)  The patient was then awoken from anesthesia and transferred to the recovery room in stable condition.  He tolerated the procedure well without any complications.  At the completion of the procedure, our sponge, instrument, and needle count was correct.  Rajwinder Veronica served as my bedside assistant for the entirety of the procedure. I was present for the entirety of  the procedure and completed all major portions myself.  PLAN:  He will be  admitted to the surgical floor post-operatively for 1-2 nights. His zepeda catheter will remain in place  until post-operative follow-up.  Scott De La Paz MD  7/24/2024 12:50 PM  Hospitalist  Post op medicine consult  69 year old male w/ PMHx of eHTN, HLD, STEVEN, Obesity BMI 36, DM2, ascending aortic aneurysm, mildly elevated  coronary calcium score, Intermediate risk prostate cancer who presented for surgery. S/p Robotic assisted laparoscopic  radical prostatectomy, bilateral pelvic lymphadenectomy, anterior urethropexy with Dr. De La Paz.  Patient seen in the pacu, denies fevers, chills, cough, dyspnea, chest pains, numbness, tingling, focal weakness, he does  report abd pains, 7/10, non radiating, mostly incisional, he has received fentanyl and dilaudid which has alleviated his  pains, pain worse w/ motion.  Blood pressure 148/84, pulse 77, temperature 97 °F (36.1 °C), temperature source Temporal, resp. rate 14, height 5' 10\"  (1.778 m), weight 255 lb (115.7 kg), SpO2 93%.  Component Value Date  ALB 4.2 07/24/2024  ALKPHO 73 07/24/2024  BILT 0.6 07/24/2024  TP 6.5 07/24/2024  AST 20 07/24/2024  ALT 22 07/24/2024  PTT 28.3 07/24/2024  INR 1.00 07/24/2024  Assessment/Plan:  69 year old male w/ PMHx of eHTN, HLD, STEVEN, Obesity BMI 36, DM2, ascending aortic aneurysm, mildly elevated  coronary calcium score, Intermediate risk prostate cancer who presented for surgery. S/p Robotic assisted laparoscopic  radical prostatectomy, bilateral pelvic lymphadenectomy, anterior urethropexy with Dr. De La Paz.  Intermediate risk prostate cancer  S/p Robotic assisted laparoscopic radical prostatectomy, bilateral pelvic lymphadenectomy, anterior urethropexy with Dr. De La Paz 07/24/24  H/o bph  Post op pain  - mgmt per urology  - zepeda mgmt per uro - plan to have decath trial in clinic  - check post op labs  Tara Fernando M (MR # [id number]) Printed at 7/29/2024 11:15 AM Page 8 of 16  [facility]  [address]  [facility] [address]  Admission Review  Fernando Pacheco  MRN: [id number], : 1955, Legal Sex: ANN  Acct #: [id number]  Visit Start: 2024, Inpt Admit Date: 2024,  Discharge Date: 2024  Review Notes (continued)  - cont flomax  - norco prn  - iv dilaudid prn  DM2  - hold home agents  - ssi  eHTN  HLD  - norvasc 10mg/d w/ hold parameters  - hydrochlorothiazide 25mg/d - hold - resume once off iv fluids  - labetalol 200mg bid w/ hold parameters  - losartan 100mg daily w/ hold parameters  - atorva in place of lovastatin  DVT ppx: lovenox  Code Status: full  Dispo: per urology  Jesus White, DO  2024 1:15 PM  2024:  Urology Progress Note  +abdominal pain, discomfort from zepeda catheter. No nausea, vomiting, fever, or chills. No CP, SOB, or calf pain.  Blood pressure 149/76, pulse 83, temperature 99 °F (37.2 °C), temperature source Axillary, resp. rate 16, height 5' 10\"  (1.778 m), weight 255 lb (115.7 kg), SpO2 97%.  MARTHA drain with SS fluid (output - 60 mL total yesterday)  Lab 24  1312  24  0546  RBC 4.19 3.81  HGB 12.7* 11.7*  HCT 39.1 35.7*  MCV 93.3 93.7  MCH 30.3 30.7  MCHC 32.5 32.8  RDW 13.6 14.0  WBC 11.2* 9.9  .0 215.0  * 156*  BUN 12 9  CREATSERUM 1.14 0.98  EGFRCR 70 83  CA 8.8 8.6*  * 140  K 4.4 4.6   107  CO2 25.0 29.0  Fernando Pacheco (MR # [id number]) Printed at 2024 11:15 AM Page 9 of 16  CONFIDENTIAL INFORMATION [address] [address]  Admission Review  Fernando Pacheco  MRN: [id number], : 1955, Legal Sex: M  Acct #: [id number]  Visit Start: 2024, Inpt Admit Date: 2024,  Discharge Date: 2024  Review Notes (continued)  Drain creat 0.8  Plan:  Encouraged ambulation and use of IS x 10/hr  Advance diet as tolerated  Pain management  Bowel regimen  Follow labs, UOP, drain output  MARTHA drain creat - Drain can be removed  CPM with zepeda catheter  Hospitalist following  Ana Cristina,  MD Scott  2024 2:40 PM  Hospitalist Progress Note  still is having abdominal pain. Patient denies any significant flatus or bowel movements  will need clear liquid diet at this time  Temp: [97 °F (36.1 °C)-98.6 °F (37 °C)] 98.6 °F (37 °C)  Pulse: [61-88] 77  Resp: [11-19] 16  BP: (128-148)/(67-89) 128/69  SpO2: [89 %-99 %] 98 %  Lab 24  0621  24  1251  24  1443  24  2120  PGLU 163* 193* 196* 175*  Post op pain  - mgmt per urology  - zepeda mgmt per uro - plan to have decath trial in clinic  - check post op labs  - cont flomax ??  - oxy po prn  - iv dilaudid prn  DM2  - hold home agents  - ssi  eHTN  HLD  - norvasc 10mg/d w/ hold parameters  - hydrochlorothiazide 25mg/d - hold - resume once off iv fluids  - labetalol 200mg bid w/ hold parameters  - losartan 100mg daily w/ hold parameters  - atorvastatin in place of lovastatin  DVT ppx: lovenox  Code Status: full  Dispo: per urology  Gagan Paz  Fernando Pacheco (MR # [id number]) Printed at 2024 11:15 AM Page 10 of 16  CONFIDENTIAL INFORMATION [address] [address]  Admission Review  Fernando Pacheco  MRN: [id number], : 1955, Legal Sex: M  Acct #: [id number]  Visit Start: 2024, Inpt Admit Date: 2024,  Discharge Date: 2024  Review Notes (continued)  2024 5:29 AM  2024:  Urology Progress Note  Pain improving. No nausea, vomiting, fever, or chills. Tolerating full liquids. +flatus. Small BM overnight. No CP, SOB, or  calf pain. Ambulating  Blood pressure 158/81, pulse 75, temperature 98.3 °F (36.8 °C), temperature source Oral, resp. rate 18, height 5' 10\"  (1.778 m), weight 255 lb (115.7 kg), SpO2 96%.  Lab 24  1312  24  0546  24  0558  RBC 4.19 3.81 3.84  HGB 12.7* 11.7* 11.6*  HCT 39.1 35.7* 35.8*  MCV 93.3 93.7 93.2  MCH 30.3 30.7 30.2  MCHC 32.5 32.8 32.4  RDW 13.6 14.0 14.2  WBC 11.2* 9.9 10.9  .0 215.0 215.0  * 156* 153*  BUN 12 9  7*  CREATSERUM 1.14 0.98 0.79  EGFRCR 70 83 96  CA 8.8 8.6* 8.7  * 140 137  K 4.4 4.6 3.9   107 105  CO2 25.0 29.0 27.0  PLAN:  Encouraged ambulation and use of IS x 10/hr  Advance diet as tolerated  Pain management  Bowel regimen  Follow labs, UOP, drain output  CPM with zepeda catheter  Hospitalist following  Possible DC today pending clinical course and approval from hospitalist.  Scott De La Paz MD  2024  Dispo: medically stable for dc  Gagan Paz  2024 1:33 PM  MEDICATIONS ADMINISTERED:  Medications 24  amLODIPine (Norvasc) tab 10 mg 1554 SKGiven  0802  SK-  0836 JBGiven  1723-  D/C'd  Fernando Pacheco (MR # [id number]) Printed at 2024 11:15 AM Page 11 of 16  CONFIDENTIAL INFORMATION [address] [address]  Admission Review  Fernando Pacheco  MRN: [id number], : 1955, Legal Sex: M  Acct #: [id number]  Visit Start: 2024, Inpt Admit Date: 2024,  Discharge Date: 2024  Review Notes (continued)  Dose: 10 mg  Freq: Daily Route: OR  Start: 24 End: 24  Given  atorvastatin (Lipitor) tab 10 mg  Dose: 10 mg  Freq: Nightly Route: OR  Start: 24 End: 24 1723  2144 RMGiven  2115  MVGiven  1723-  D/C'd  ceFAZolin (Ancef) 2g in 10mL IV syringe  premix  Dose: 2 g  Freq: Once Route: IV  Start: 24 End: 24  Admin Instructions:  Re-dose if surgery lasts longer than 4 hours  Order specific questions:  824 JRGiven  docusate sodium (Colace) cap 100 mg  Dose: 100 mg  Freq: 2 times daily Route: OR  Start: 24 End: 24  Admin Instructions:  Do not crush  2144 RMGiven  0802  SKGiven  2115  MVGiven  0836 JBGiven  1723-  D/C'd  enoxaparin (Lovenox) 40 MG/0.4ML SUBQ  injection 40 mg  Dose: 40 mg  Freq: Daily Route: SC  Start: 24 08 End: 24  Admin Instructions:  Only administer Enoxaparin subcutaneously into the  abdomen unless directed  otherwise by a physician.  Alternate injection sites between the left and right  anterolateral and left and right posterolateral abdominal  wall.  0803  SKGiven  0837 JBGiven  1723-  D/C'd  enoxaparin (Lovenox) 40 MG/0.4ML SUBQ  injection 40 mg  Dose: 40 mg  Freq: Once Route: SC  Start: 24 0615 End: 24  Admin Instructions:  Only administer Enoxaparin subcutaneously into the  abdomen unless directed otherwise by a physician.  Alternate injection sites between the left and right  anterolateral and left and right posterolateral abdominal  wall.  715 NRGiven  insulin aspart (NovoLOG) 100 Units/mL  FlexPen 1-10 Units  Dose: 1-10 Units  Freq: 3 times daily before meals and  nightly Route: SC  Start: 24 End: 24  Admin Instructions:  Correction Insulin - calculate insulin requirement  Give 1 unit of Novolog (aspart) insulin for every 20  points blood glucose is greater than 140 mg/dL  172 SKGiven  2144 RMGiven  (0700  MV)-Not  Given  1132  SKGiven  (1600  SK)-Not  Given  2117  MVGiven  0834 JBGiven  1320 JBGiven  1700 ADCanceled  Entry [C]  1723-  D/C'd  TaraHangin ANN (MR # [id number]) Printed at 2024 11:15 AM Page 12 of 16  CONFIDENTIAL INFORMATION [address] [address]  Admission Review  Tara, Fernando JUAREZ  MRN: [id number], : 1955, Legal Sex: M  Acct #: [id number]  Visit Start: 2024, Inpt Admit Date: 2024,  Discharge Date: 2024  Review Notes (continued)  **DO NOT HOLD OR ALTER INSULIN DOSE  WITHOUT A PHYSICIAN ORDER**  Give Novolog/aspart insulin subcutaneously within 30  minutes of scheduled finger-stick time  Notify physician for blood glucose >351mg/dL with time  and last dose of correction insulin given.  labetalol (Normodyne) tab 200 mg  Dose: 200 mg  Freq: 2 times daily Route: OR  Start: 24 End: 24 RMGiven  0802  SKGiven  2115  Given  0836 [facility]  1723-  D/C'd  losartan (Cozaar) tab  100 mg  Dose: 100 mg  Freq: Daily Route: OR  Start: 24 1500 End: 24 1723  1554 SKGiven  0802  SKGiven  0836 JBGiven  1723-  D/C'd  sennosides (Senokot) tab 17.2 mg  Dose: 17.2 mg  Freq: Nightly Route: OR  Start: 24 End: 24 1723  (2100 MV)-  Not Given    MVGiven  1723-  D/C'd  lactated ringers infusion  Rate: 100 mL/hr  Freq: Continuous Route: IV  Last Dose: Stopped (24 145)  Start: 24 0800 End: 24 142  1303 MLRate/  Dos  e Change  142-  D/C'd  1459 MGStopped  lactated ringers infusion  Rate: 20 mL/hr  Freq: Continuous Route: IV  Start: 24 End: 24 144  Admin Instructions:  TKO  0712 NRNew  Bag  0759 JRContinue  d by  Anesthes  ia  0801 JRPaused  [C]  0802 JRRestarted  0808 JRNew  Bag  1217 JRNew  Bag  1449-  D/C'd  sodium chloride 0.9% infusion  Rate: 75 mL/hr  Freq: Continuous Route: IV  Last Dose: Stopped (24)  Start: 24 End: 24  1459 MGNew  Bag  2150 RMNew  Bag  0536 MVRate/  Dose  Change  0 MVStopped  [C]  1723-  D/C'd  bacitracin 500 UNIT/GM ointment  Freq: Every 8 hours PRN Route: TOP  PRN Reason: Wound care  PRN Comment: apply to tip of penis q shift as needed  Start: 24 144 End: 24  Order specific questions:  0112 MVGiven  1723-  D/C'd  benzocaine-menthol (Cepacol) lozenge 1  lozenge  Dose: 1 lozenge  Freq: Every 15 min PRN Route: BU  PRN Reason: sore throat  Start: 24 144 End: 24 1723  1812 SKGiven  1723-  D/C'd  Fernando Pacheco (MR # [id number]) Printed at 2024 11:15 AM Page 13 of 16  CONFIDENTIAL INFORMATION [address] [address]  Admission Review  Fernando Pacheco  MRN: [id number], : 1955, Legal Sex: M  Acct #: [id number]  Visit Start: 2024, Inpt Admit Date: 2024,  Discharge Date: 2024  Review Notes (continued)  Or  Or  Vitals (last day) before discharge  Date/Time Temp Pulse Resp BP SpO2  Weight  O2  Device  O2 Flow  Rate  (L/min) [facility]  07/26/24 1210 99.6 °F 77 18 125/60 93 % -- -- -- IP  Admin Instructions:  May leave at bedside  fentaNYL (Sublimaze) 50 mcg/mL injection  25 mcg  Dose: 25 mcg  Freq: Every 5 min PRN Route: IV  PRN Reason: moderate pain  Start: 07/24/24 0750 End: 07/24/24 1425  Admin Instructions:  Total maximum of 100 mcg.  Use PRN reason as a guide and follow range order  policy. Use as first line medication.  1316  MLGiven  1322  MLGiven  1342 MLGiven  1425-  D/C'd  HYDROmorphone (Dilaudid) 1 MG/ML  injection 0.4 mg  Dose: 0.4 mg  Freq: Every 5 min PRN Route: IV  PRN Reason: moderate pain  Start: 07/24/24 0750 End: 07/24/24 1425  Admin Instructions:  Total maximum amount 3mg.  Use PRN reason as a guide and follow range order  policy. Use as second line medication if first line  narcotic is ineffective.  1335  MLGiven  1425-  D/C'd  oxybutynin (Ditropan) tab 5 mg  Dose: 5 mg  Freq: Every 6 hours PRN Route: OR  PRN Reason: bladder spasms  PRN Comment: bladder spasms  Start: 07/24/24 1449 End: 07/26/24 1723  0101 MVGiven  1723-  D/C'd  oxyCODONE immediate release partial tab  2.5 mg  Dose: 2.5 mg  Freq: Every 4 hours PRN Route: OR  PRN Reason: moderate pain  Start: 07/24/24 1449 End: 07/26/24 1723  Admin Instructions:  Use PRN reason as a guide and follow range order  policy  1727  SK-See  Alt  2150  RM-See  Alt  0622 MVSee  Alt  1051 SKSee  Alt  1807 SK-See  Alt  2205 MVGiven  0446  MV-See  Alt  0850 JBSee  Alt  1723-  D/C'd  oxyCODONE immediate release tab 5 mg  Dose: 5 mg  Freq: Every 4 hours PRN Route: OR  PRN Reason: severe pain  Start: 07/24/24 1449 End: 07/26/24 1723  Admin Instructions:  Use PRN reason as a guide and follow range order  policy  1727  SKGiven  2150  RMGiven  0622 MVGiven  1051 SKGiven  1807 SKGiven  2205 MVSee  Alt  0446  MVGiven  0850 [facility]  1723-  D/C'd  Fernando Pacheco (MR # [id number]) Printed at 7/29/2024 11:15 AM Page 14 of 16  CONFIDENTIAL INFORMATION  [address] [address]  Admission Review  Fernando Pacheco  MRN: [id number], : 1955, Legal Sex: ANN  Acct #: [id number]  Visit Start: 2024, Inpt Admit Date: 2024,  Discharge Date: 2024  Review Notes (continued)  Date/Time Temp Pulse Resp BP SpO2 Weight  O2  Device  O2 Flow  Rate  (L/min) Who  (37.6 °C)  24 1210 -- -- -- -- -- -- None  (Room  air)  -- ARNULFO  24 0835 -- -- -- -- -- -- Nasal  cannula  1 L/min AM  24 0747 99.3 °F  (37.4 °C)  79 18 146/76 93 % -- Nasal  cannula  1 L/min IP  24 0432 98.3 °F  (36.8 °C)  75 18 158/81 96 % -- Nasal  cannula  1 L/min NJ  24 0000 98.7 °F  (37.1 °C)  69 -- 152/82 92 % -- Nasal  cannula  1 L/min NJ  24 2030 99.4 °F  (37.4 °C)  85 18 141/70 93 % -- Nasal  cannula  1 L/min NJ  24 1700 98 °F  (36.7 °C)  73 19 149/70 98 % -- None  (Room  air)  --   24 1300 -- 71 -- -- -- -- -- --   24 1129 98.1 °F  (36.7 °C)  60 18 139/72 97 % -- None  (Room  air)  --   24 0800 98 °F  (36.7 °C)  63 17 131/69 93 % -- None  (Room  air)  -- NW  24 0652 -- 64 15 118/60 95 % -- None  (Room  air)  0 L/min MV  24 0609 98.7 °F  (37.1 °C)  65 14 127/62 96 % -- Nasal  cannula  1 L/min JBA  24 0500 -- -- -- -- -- -- -- 1 L/min MV  24 0051 99 °F  (37.2 °C)  83 16 149/76 97 % -- Nasal  cannula  2 L/min MV  24 2119 98.6 °F (37 °C) 77 16 128/69 98 % -- Nasal cannula 3 L/min JBA  24 1723 98.1 °F (36.7 °C) 72 16 148/71 99 % -- Nasal cannula 3 L/min NW  24 1547 -- 61 -- -- 96 % -- -- -- NW  24 1450 -- -- -- -- -- 255 lb (115.7 kg) -- -- MG  24 1433 97.7 °F (36.5 °C) 69 14 135/67 92 % -- Nasal cannula 3 L/min NW  24 1400 97.2 °F (36.2 °C) -- -- -- -- -- Nasal cannula 3 L/min ML  24 1350 -- 77 11 140/69 94 % -- -- 3 L/min ML  24 1335 -- 67 13 148/89 94 % -- Nasal cannula 3 L/min ML  24 1320 -- 67 16 139/79 95 % -- -- 3 L/min  ML  07/24/24 1305 -- 77 14 148/84 93 % -- -- 3 L/min ML  07/24/24 1300 -- 79 19 147/85 93 % -- -- -- ML  07/24/24 1255 -- 83 19 138/76 89 % Abnormal -- -- 4 L/min ML  07/24/24 1250 97 °F (36.1 °C) 88 13 138/87 92 % -- Nasal cannula 3 L/min ML  07/24/24 0613 97.6 °F (36.4 °C) 61 16 138/78 97 % 255 lb (115.7 kg) -- -- NATALIE      FOR REVIEW/APPROVAL OF INPT ADMISSION

## 2024-07-31 NOTE — DISCHARGE SUMMARY
[facility]  Discharge Summary    Fernando Pacheco Patient Status:  Inpatient    1955 MRN [id number]   Location [facility] 3NW-A Attending No att. providers found   Hosp Day # 1 PCP Andrey Nguyen MD         Admit date: 2024    Discharge date and time: 2024  3:23 PM     Admitting Physician: Scott De La Paz MD     Discharge Physician: Ana Cristina    Admission Diagnoses: PROSTATE CANCER  Prostate cancer (HCC)  S/P prostatectomy    Discharge Diagnoses: Same    Admission Condition: good    Discharged Condition: good    Indication for Admission: Prostate Cancer    Hospital Course: Admitted on 2024 and underwent a robotic-assisted lap. Radical prostatectomy with bilateral pelvic lymphadenectomy.  Uneventful post-op course.  MARTHA drain was removed on POD 1, as output was low and drain creatinine was c/w serum creatinine.  He was discharged to home on POD 2 in stable condition. He will follow up in ~1 week for zepeda removal in the office.    Consults: none    Significant Diagnostic Studies: None    Treatments: surgery: RALP with PLND on 2024    Discharge Exam:  See progress note from day of discharge.    Disposition: Home or Self Care    Patient Instructions:      Discharge Medications        CONTINUE taking these medications        Instructions Prescription details   Accu-Chek FastClix Lancets [facility]      [place] has accu-chek joan meter.Tests twice daily   Quantity: 200 each  Refills: 3     Accu-Chek Joan SmartView w/Device Kit      Used as directed.   Quantity: 1 kit  Refills: 0     amLODIPine 10 MG Tabs  Commonly known as: Norvasc      Take 1 tablet (10 mg total) by mouth daily.   Quantity: 90 tablet  Refills: 3     fluticasone propionate 50 MCG/ACT Susp  Commonly known as: Flonase      2 sprays by Nasal route daily.   Refills: 0     Glucose Blood Strp      Test twice daily three times weekly   Quantity: 50 strip  Refills: 5     hydroCHLOROthiazide 25 MG Tabs      Take 1 tablet (25 mg total) by mouth once  daily.   Quantity: 90 tablet  Refills: 3     labetalol 200 MG Tabs  Commonly known as: Normodyne      Take 1 tablet (200 mg total) by mouth 2 (two) times daily.   Quantity: 180 tablet  Refills: 3     losartan 100 MG Tabs  Commonly known as: Cozaar      Take 1 tablet (100 mg total) by mouth daily.   Quantity: 90 tablet  Refills: 3     Lovastatin 40 MG Tabs      Take 1 tablet (40 mg total) by mouth nightly.   Refills: 0     metFORMIN 500 MG Tabs  Commonly known as: Glucophage      TAKE 2 TABLETS(1000 MG) BY MOUTH TWICE DAILY WITH MEALS   Quantity: 360 tablet  Refills: 0     Multi-Vitamin/Minerals Tabs      Take 1 tablet by mouth daily.   Refills: 0            STOP taking these medications      Sildenafil Citrate 100 MG Tabs  Commonly known as: VIAGRA        tamsulosin 0.4 MG Caps  Commonly known as: Flomax               Activity: activity as tolerated  Diet: regular diet  Wound Care: keep wound clean and dry    Follow-up with Urology RN in 1 week.    Signed:  Scott De La Paz MD  7/31/2024  6:25 PM

## (undated) DEVICE — TOWEL: OR BLU 80/CS: Brand: MEDICAL ACTION INDUSTRIES

## (undated) DEVICE — MONOPOLAR CURVED SCISSORS: Brand: ENDOWRIST

## (undated) DEVICE — SUTURE STRATAFIX PDS PLUS 45CM

## (undated) DEVICE — Device

## (undated) DEVICE — ADHESIVE SKIN TOP FOR WND CLSR DERMBND ADV

## (undated) DEVICE — CANNULA SEAL

## (undated) DEVICE — ABSORBABLE WOUND CLOSURE DEVICE: Brand: V-LOC 90

## (undated) DEVICE — TROCAR: Brand: KII FIOS FIRST ENTRY

## (undated) DEVICE — CATHETER URETH 18FR BLLN 5CC SIL ALLY W/ SIL

## (undated) DEVICE — SUT ETHLN 2-0 18IN FS NABSRB BLK 26MM 3/8 CIR

## (undated) DEVICE — BLADELESS OBTURATOR: Brand: WECK VISTA

## (undated) DEVICE — ROBOTIC UROLOGY PROSTATE: Brand: MEDLINE INDUSTRIES, INC.

## (undated) DEVICE — SLEEVE COMPR MD KNEE LEN SGL USE KENDALL SCD

## (undated) DEVICE — ABSORBABLE HEMOSTAT (OXIDIZED REGENERATED CELLULOSE): Brand: SURGICEL NU-KNIT

## (undated) DEVICE — POSITIONER OR KT PT CR

## (undated) DEVICE — SHEET,DRAPE,70X100,STERILE: Brand: MEDLINE

## (undated) DEVICE — 40580 - THE PINK PAD - ADVANCED TRENDELENBURG POSITIONING KIT: Brand: 40580 - THE PINK PAD - ADVANCED TRENDELENBURG POSITIONING KIT

## (undated) DEVICE — SUT MCRYL 4-0 18IN PS-2 ABSRB UD 19MM 3/8 CIR

## (undated) DEVICE — WRAP COOLING HIP W/ICE PILLOW

## (undated) DEVICE — 3M™ STERI-DRAPE™ INSTRUMENT POUCH 1018: Brand: STERI-DRAPE™

## (undated) DEVICE — COLUMN DRAPE

## (undated) DEVICE — 1010 S-DRAPE TOWEL DRAPE 10/BX: Brand: STERI-DRAPE™

## (undated) DEVICE — GLOVE SUR 7.5 SENSICARE NEOPR PWD F

## (undated) DEVICE — SOL  .9 3000ML

## (undated) DEVICE — HOOD, PEEL-AWAY: Brand: FLYTE

## (undated) DEVICE — Device: Brand: BOOT LINER, DISPOSABLE

## (undated) DEVICE — BLADE ELECTRODE: Brand: EDGE

## (undated) DEVICE — VIOLET BRAIDED (POLYGLACTIN 910), SYNTHETIC ABSORBABLE SUTURE: Brand: COATED VICRYL

## (undated) DEVICE — GAMMEX® PI HYBRID SIZE 8, STERILE POWDER-FREE SURGICAL GLOVE, POLYISOPRENE AND NEOPRENE BLEND: Brand: GAMMEX

## (undated) DEVICE — BIPOLAR SEALER 23-112-1 AQM 6.0: Brand: AQUAMANTYS™

## (undated) DEVICE — SYRINGE 30ML LL TIP

## (undated) DEVICE — BIPOLAR GRASPER, LONG: Brand: ENDOWRIST

## (undated) DEVICE — STANDARD HYPODERMIC NEEDLE,POLYPROPYLENE HUB: Brand: MONOJECT

## (undated) DEVICE — LAPAROTOMY SPONGE - RF AND X-RAY DETECTABLE PRE-WASHED: Brand: SITUATE

## (undated) DEVICE — SUT PDS II 0 36IN CT-1 ABSRB VLT L36MM 1/2

## (undated) DEVICE — ARM DRAPE

## (undated) DEVICE — GAMMEX® NON-LATEX PI ORTHO SIZE 7.5, STERILE POLYISOPRENE POWDER-FREE SURGICAL GLOVE: Brand: GAMMEX

## (undated) DEVICE — PROGRASP FORCEPS: Brand: ENDOWRIST

## (undated) DEVICE — LUBRICANT ELECTRD 4ML ANTISTICK SOL W/ FOAM

## (undated) DEVICE — SPONGE GZ 4IN X 4IN RAYON POLY 6 PLY UNIQUE

## (undated) DEVICE — LAPAROVUE VISIBILITY SYSTEM LAPAROSCOPIC SOLUTIONS: Brand: LAPAROVUE

## (undated) DEVICE — ANCHOR TISSUE RETRIEVAL SYSTEM, BAG SIZE 175 ML, PORT SIZE 10 MM: Brand: ANCHOR TISSUE RETRIEVAL SYSTEM

## (undated) DEVICE — SPECIMEN CONTAINER,POSITIVE SEAL INDICATOR, OR PACKAGED: Brand: PRECISION

## (undated) DEVICE — SUTURE MONOCRYL 3-0 PS-2

## (undated) DEVICE — Device: Brand: STABLECUT®

## (undated) DEVICE — UNIT THERA PREVENA 45ML

## (undated) DEVICE — DRAIN SUR 15FR L3/16IN DIA4.7MM SIL RND

## (undated) DEVICE — SUTURE FIBERWIRE S AR-7200

## (undated) DEVICE — TIP COVER ACCESSORY

## (undated) DEVICE — GAMMEX® PI HYBRID SIZE 7, STERILE POWDER-FREE SURGICAL GLOVE, POLYISOPRENE AND NEOPRENE BLEND: Brand: GAMMEX

## (undated) DEVICE — SOLUTION IV 1000ML 0.9% NACL PRESERVATIVE

## (undated) DEVICE — SUTURE VICRYL 0 CP-1

## (undated) DEVICE — DRAPE C-ARM UNIVERSAL

## (undated) DEVICE — DRAPE,TOP,102X53,STERILE: Brand: MEDLINE

## (undated) DEVICE — 3M™ IOBAN™ 2 ANTIMICROBIAL INCISE DRAPE 6650EZ: Brand: IOBAN™ 2

## (undated) DEVICE — ABSORBABLE WOUND CLOSURE DEVICE: Brand: V-LOC 180

## (undated) DEVICE — SUTURE VICRYL 2-0 CP-1

## (undated) DEVICE — LARGE NEEDLE DRIVER: Brand: ENDOWRIST

## (undated) DEVICE — COVER,LIGHT,CAMERA,HARD,1/PK,STRL: Brand: MEDLINE

## (undated) DEVICE — EVACUATOR SUR 100CC SIL BLB WND

## (undated) DEVICE — INSUFFLATION NEEDLE TO ESTABLISH PNEUMOPERITONEUM.: Brand: INSUFFLATION NEEDLE

## (undated) DEVICE — GLOVE SUR 7.5 SENSICARE PI PIP CRM PWD F

## (undated) DEVICE — ANTERIOR HIP: Brand: MEDLINE INDUSTRIES, INC.

## (undated) DEVICE — 1016 S-DRAPE IRRIG POUCH 10/BOX: Brand: STERI-DRAPE™

## (undated) DEVICE — KIT HEMSTAT MTRX 8ML PORCINE GEL HUM THROM

## (undated) DEVICE — PLUMEPORT ACTIV LAPAROSCOPIC SMOKE FILTRATION DEVICE: Brand: PLUMEPORT ACTIVE

## (undated) DEVICE — [HIGH FLOW INSUFFLATOR,  DO NOT USE IF PACKAGE IS DAMAGED,  KEEP DRY,  KEEP AWAY FROM SUNLIGHT,  PROTECT FROM HEAT AND RADIOACTIVE SOURCES.]: Brand: PNEUMOSURE

## (undated) NOTE — LETTER
Patient Name: Fernando Pacheco  -Age / Sex: 1955-A: 69 y  male   Medical Records: [id number]    CSN: [id number]    CBC Testing  SURGERY DATE: 2024            PROCEDURE:  XI ROBOT-ASSISTED LAPAROSCOPIC RADICAL PROSTATECTOMY WITH BILATERAL PELVIC LYMPHADENECTOMY  The above patient had a CBC without differential screen done on 24, but a CBC with differential was ordered. He also had BMP but CMP was ordered. PT/PTTwere omitted from testing.  We will order Liver profile to complete CMP and PT/PTT on admit.  Is it okay to use this testing for the above scheduled procedure?  Yes ______  No, repeat the test _______    _________________________________________              ____________  MD signature       Date    Please return this completed and signed response to [phone]    Thank you.